# Patient Record
Sex: FEMALE | Race: WHITE | Employment: OTHER | URBAN - METROPOLITAN AREA
[De-identification: names, ages, dates, MRNs, and addresses within clinical notes are randomized per-mention and may not be internally consistent; named-entity substitution may affect disease eponyms.]

---

## 2018-03-26 ENCOUNTER — OFFICE VISIT (OUTPATIENT)
Dept: ORTHOPEDIC SURGERY | Age: 73
End: 2018-03-26
Payer: MEDICARE

## 2018-03-26 VITALS
DIASTOLIC BLOOD PRESSURE: 84 MMHG | RESPIRATION RATE: 18 BRPM | TEMPERATURE: 98.2 F | SYSTOLIC BLOOD PRESSURE: 129 MMHG | HEART RATE: 91 BPM

## 2018-03-26 DIAGNOSIS — T14.8XXA FX: Primary | ICD-10-CM

## 2018-03-26 DIAGNOSIS — S52.602A CLOSED FRACTURE OF LEFT DISTAL RADIUS AND ULNA, CLOSED, INITIAL ENCOUNTER: ICD-10-CM

## 2018-03-26 DIAGNOSIS — S52.502A CLOSED FRACTURE OF LEFT DISTAL RADIUS AND ULNA, CLOSED, INITIAL ENCOUNTER: ICD-10-CM

## 2018-03-26 PROCEDURE — 99204 OFFICE O/P NEW MOD 45 MIN: CPT | Performed by: ORTHOPAEDIC SURGERY

## 2018-03-26 PROCEDURE — 29125 APPL SHORT ARM SPLINT STATIC: CPT | Performed by: ORTHOPAEDIC SURGERY

## 2018-03-26 RX ORDER — MELATONIN 10 MG
CAPSULE ORAL
COMMUNITY
End: 2018-03-27

## 2018-03-26 RX ORDER — LEVOTHYROXINE SODIUM 0.07 MG/1
TABLET ORAL
COMMUNITY
Start: 2005-06-11 | End: 2018-03-26

## 2018-03-26 RX ORDER — HYDROCODONE BITARTRATE AND ACETAMINOPHEN 5; 325 MG/1; MG/1
TABLET ORAL
Refills: 0 | COMMUNITY
Start: 2018-03-23 | End: 2018-03-26

## 2018-03-26 RX ORDER — LEVOTHYROXINE SODIUM 0.07 MG/1
75 TABLET ORAL
Refills: 0 | COMMUNITY
Start: 2018-03-01

## 2018-03-26 RX ORDER — HYDROCODONE BITARTRATE AND ACETAMINOPHEN 10; 325 MG/1; MG/1
TABLET ORAL
Refills: 0 | COMMUNITY
Start: 2018-03-02 | End: 2020-01-01

## 2018-03-26 RX ORDER — HYDROCODONE BITARTRATE AND ACETAMINOPHEN 10; 325 MG/1; MG/1
1 TABLET ORAL EVERY 4 HOURS PRN
Qty: 42 TABLET | Refills: 0 | Status: SHIPPED | OUTPATIENT
Start: 2018-03-26 | End: 2018-04-02

## 2018-03-26 NOTE — PROGRESS NOTES
Volar splint applied to left wrist.
91   Temp 98.2 °F (36.8 °C) (Oral)   Resp 18   CONSTITUTIONAL:  awake, alert, cooperative, no apparent distress, and appears stated age  EYES: Lids and lashes normal, pupils equal, round and reactive to light, extra ocular muscles intact  HENT: Normocephalic, without obvious abnormality, atraumatic, neck supple, symmetric  LUNGS: No increased work of breathing  CARDIOVASCULAR: Brisk vascular capillary refill to all extremities  ABDOMEN: Non-tender, Non-distended  NEUROLOGIC: Awake, alert, oriented to name, place and time. Cranial nerves II-XII are grossly intact. MUSCULOSKELETAL:  Left upper Extremity:  Left wrist Skin intact circumferentially  +TTP Distal radius  Forearm -  Compartments soft and compressible  + Swelling over the Distal Radius  +AIN/PIN/Ulnar nerve function intact grossly  +Radial pulse, Brisk Cap refill, hand warm and perfused  Sensation intact to touch in radial/ulnar/median nerve distributions to hand    DATA:    CBC: No results found for: WBC, RBC, HGB, HCT, MCV, MCH, MCHC, RDW, PLT, MPV  PT/INR:  No results found for: PROTIME, INR    Radiology Review:  03/26/18 - XR Left wrist 3 - views - AP/Oliq/ Lat  Comminuted intra- articular with dorsal tilt and shortening disatl radius fracture , Ulnar styloid fracture   Impression - Left Distal radius fracture    IMPRESSION:  ·  Closed, Left Distal Radius Fx    PLAN:  Discussed findings and treatment options.   Non-weight bearing to Left Upper Extermity  Ice and elevation to  leftUE  We are recommendation is surgical ORIF for Left wrist  Patient in agreement with place  Plan for Left wrist ORIF    I explained the risks, benefits, alternatives and complications of surgery with the patient including but not limited to the risks of infection, possible damage to nerves, vessels, or tendons, stiffness, loss of range of motion, scar sensitivity, wound healing complications, worsening symptoms, possible need for therapy, as well as the possible need further

## 2018-03-27 ENCOUNTER — PREP FOR PROCEDURE (OUTPATIENT)
Dept: ORTHOPEDIC SURGERY | Age: 73
End: 2018-03-27

## 2018-03-27 RX ORDER — VITAMIN B COMPLEX
1 CAPSULE ORAL DAILY
COMMUNITY
End: 2020-01-01

## 2018-03-27 RX ORDER — SODIUM CHLORIDE 0.9 % (FLUSH) 0.9 %
10 SYRINGE (ML) INJECTION EVERY 12 HOURS SCHEDULED
Status: CANCELLED | OUTPATIENT
Start: 2018-03-27

## 2018-03-27 RX ORDER — SODIUM CHLORIDE 9 MG/ML
INJECTION, SOLUTION INTRAVENOUS CONTINUOUS
Status: CANCELLED | OUTPATIENT
Start: 2018-03-27

## 2018-03-27 RX ORDER — NAPROXEN SODIUM 220 MG
220 TABLET ORAL PRN
COMMUNITY
End: 2020-01-01

## 2018-03-27 RX ORDER — SODIUM CHLORIDE 0.9 % (FLUSH) 0.9 %
10 SYRINGE (ML) INJECTION PRN
Status: CANCELLED | OUTPATIENT
Start: 2018-03-27

## 2018-03-27 NOTE — H&P
of range of motion, scar sensitivity, wound healing complications, worsening symptoms, possible need for therapy, as well as the possible need further surgery and unanticipated complications.  The patient voiced understanding and all questions were answered. The patient elected to proceed with surgical intervention.     I have seen and evaluated the patient and agree with the above assessment and plan on today's visit. I have performed the key components of the history and physical examination with significant findings of left wrist severely comminuted intra-articular displaced fracture. Severity of injury was explained to the patient. Plan for surgical stabilization.  I concur with the findings and plan as documented.

## 2018-03-27 NOTE — PROGRESS NOTES
[facility] PRE-ADMISSION TESTING INSTRUCTIONS    The Preadmission Testing patient is instructed accordingly using the following criteria (check applicable):    ARRIVAL INSTRUCTIONS:  [x] Parking the day of Surgery is located in the Main Entrance lot. Upon entering the door, make an immediate right to the surgery reception desk    [x] Complimentary [facility] Park[employer] is available Monday through Friday 6 am to 6 pm    [x] Bring photo ID and insurance card    [x] Bring in a copy of Living will or Durable Power of  papers. [x] Please be sure to arrange transportation to and from the hospital    [x] Please arrange for someone to be with you for the 24 hour period post procedure due to having anesthesia      GENERAL INSTRUCTIONS:    [x] Nothing by mouth after midnight, including gum, candy, mints or water    [x] You may brush your teeth, but do not swallow any water    [x] Take medications as instructed with 1-2 oz of water    [x] Stop herbal supplements and vitamins 5 days prior to procedure    [x] Follow preop dosing of blood thinners per physician instructions    [] Do not take insulin or oral diabetic medications    [] If diabetic and have low blood sugar or feel symptomatic, take 1-2oz apple juice or glucose tablets    [] Bring inhalers day of surgery    [] Bring C-PAP/ Bi-Pap day of surgery    [] Bring urine specimen day of surgery    [x] Shower or bath with soap, lather and rinse well, AM of Surgery, no lotion, powders or creams to surgical site    [] Follow bowel prep as instructed per surgeon    [x] No tobacco products within 24 hours of surgery     [x] No alcohol or illegal drug use within 24 hours of surgery.     [x] Jewelry, body piercing's, eyeglasses, contact lenses and dentures are not permitted into surgery (bring cases)      [x] Please do not wear any nail polish, make up or hair products on the day of surgery    [x] If not already done, you can expect a call from

## 2018-03-28 ENCOUNTER — ANESTHESIA EVENT (OUTPATIENT)
Dept: OPERATING ROOM | Age: 73
End: 2018-03-28
Payer: MEDICARE

## 2018-03-28 ENCOUNTER — HOSPITAL ENCOUNTER (OUTPATIENT)
Dept: GENERAL RADIOLOGY | Age: 73
Discharge: HOME | End: 2018-03-30
Attending: ORTHOPAEDIC SURGERY
Payer: MEDICARE

## 2018-03-28 ENCOUNTER — ANESTHESIA (OUTPATIENT)
Dept: OPERATING ROOM | Age: 73
End: 2018-03-28
Payer: MEDICARE

## 2018-03-28 ENCOUNTER — HOSPITAL ENCOUNTER (OUTPATIENT)
Age: 73
Setting detail: OUTPATIENT SURGERY
Discharge: HOME | End: 2018-03-28
Attending: ORTHOPAEDIC SURGERY | Admitting: ORTHOPAEDIC SURGERY
Payer: MEDICARE

## 2018-03-28 VITALS
SYSTOLIC BLOOD PRESSURE: 177 MMHG | HEIGHT: 63 IN | WEIGHT: 105 LBS | BODY MASS INDEX: 18.61 KG/M2 | OXYGEN SATURATION: 90 % | DIASTOLIC BLOOD PRESSURE: 89 MMHG | HEART RATE: 88 BPM | TEMPERATURE: 97.5 F | RESPIRATION RATE: 16 BRPM

## 2018-03-28 VITALS
SYSTOLIC BLOOD PRESSURE: 103 MMHG | OXYGEN SATURATION: 99 % | RESPIRATION RATE: 11 BRPM | DIASTOLIC BLOOD PRESSURE: 50 MMHG

## 2018-03-28 DIAGNOSIS — R52 PAIN: ICD-10-CM

## 2018-03-28 PROBLEM — G89.18 POST-OP PAIN: Status: ACTIVE | Noted: 2018-03-28

## 2018-03-28 PROCEDURE — 76000 FLUOROSCOPY <1 HR PHYS/QHP: CPT

## 2018-03-28 PROCEDURE — 3600000013 HC SURGERY LEVEL 3 ADDTL 15MIN: Performed by: ORTHOPAEDIC SURGERY

## 2018-03-28 PROCEDURE — 25609 OPTX DST RD XART FX/EP SEP3+: CPT | Performed by: ORTHOPAEDIC SURGERY

## 2018-03-28 PROCEDURE — 3700000000 HC ANESTHESIA ATTENDED CARE: Performed by: ORTHOPAEDIC SURGERY

## 2018-03-28 PROCEDURE — C1713 ANCHOR/SCREW BN/BN,TIS/BN: HCPCS | Performed by: ORTHOPAEDIC SURGERY

## 2018-03-28 PROCEDURE — 3700000001 HC ADD 15 MINUTES (ANESTHESIA): Performed by: ORTHOPAEDIC SURGERY

## 2018-03-28 PROCEDURE — 93005 ELECTROCARDIOGRAM TRACING: CPT

## 2018-03-28 PROCEDURE — C1769 GUIDE WIRE: HCPCS | Performed by: ORTHOPAEDIC SURGERY

## 2018-03-28 PROCEDURE — 2500000003 HC RX 250 WO HCPCS: Performed by: ORTHOPAEDIC SURGERY

## 2018-03-28 PROCEDURE — 6370000000 HC RX 637 (ALT 250 FOR IP): Performed by: ANESTHESIOLOGY

## 2018-03-28 PROCEDURE — 25650 CLTX ULNAR STYLOID FRACTURE: CPT | Performed by: ORTHOPAEDIC SURGERY

## 2018-03-28 PROCEDURE — 3600000003 HC SURGERY LEVEL 3 BASE: Performed by: ORTHOPAEDIC SURGERY

## 2018-03-28 PROCEDURE — 7100000010 HC PHASE II RECOVERY - FIRST 15 MIN: Performed by: ORTHOPAEDIC SURGERY

## 2018-03-28 PROCEDURE — 7100000001 HC PACU RECOVERY - ADDTL 15 MIN: Performed by: ORTHOPAEDIC SURGERY

## 2018-03-28 PROCEDURE — 2500000003 HC RX 250 WO HCPCS: Performed by: NURSE ANESTHETIST, CERTIFIED REGISTERED

## 2018-03-28 PROCEDURE — 7100000000 HC PACU RECOVERY - FIRST 15 MIN: Performed by: ORTHOPAEDIC SURGERY

## 2018-03-28 PROCEDURE — 2580000003 HC RX 258: Performed by: NURSE ANESTHETIST, CERTIFIED REGISTERED

## 2018-03-28 PROCEDURE — 6360000002 HC RX W HCPCS: Performed by: PHYSICIAN ASSISTANT

## 2018-03-28 PROCEDURE — 6360000002 HC RX W HCPCS: Performed by: NURSE ANESTHETIST, CERTIFIED REGISTERED

## 2018-03-28 PROCEDURE — 6360000002 HC RX W HCPCS: Performed by: ANESTHESIOLOGY

## 2018-03-28 PROCEDURE — 7100000011 HC PHASE II RECOVERY - ADDTL 15 MIN: Performed by: ORTHOPAEDIC SURGERY

## 2018-03-28 DEVICE — 2.3MM X 18MM LOCKING CORTICAL SCREW
Type: IMPLANTABLE DEVICE | Site: WRIST | Status: FUNCTIONAL
Brand: ACUMED

## 2018-03-28 DEVICE — 3.5MM X 12MM LOCKING HEXALOBE SCREW
Type: IMPLANTABLE DEVICE | Site: WRIST | Status: FUNCTIONAL
Brand: ACUMED

## 2018-03-28 DEVICE — 3.5MM X 12MM NON-LOCKING HEXALOBE SCREW
Type: IMPLANTABLE DEVICE | Site: WRIST | Status: FUNCTIONAL
Brand: ACUMED

## 2018-03-28 DEVICE — 3.5MM X 10MM LOCKING HEXALOBE SCREW
Type: IMPLANTABLE DEVICE | Site: WRIST | Status: FUNCTIONAL
Brand: ACUMED

## 2018-03-28 DEVICE — IMPLANTABLE DEVICE: Type: IMPLANTABLE DEVICE | Site: WRIST | Status: FUNCTIONAL

## 2018-03-28 DEVICE — 2.3MM X 20MM LOCKING CORTICAL SCREW
Type: IMPLANTABLE DEVICE | Site: WRIST | Status: FUNCTIONAL
Brand: ACUMED

## 2018-03-28 RX ORDER — SODIUM CHLORIDE 9 MG/ML
INJECTION, SOLUTION INTRAVENOUS CONTINUOUS PRN
Status: DISCONTINUED | OUTPATIENT
Start: 2018-03-28 | End: 2018-03-28 | Stop reason: SDUPTHER

## 2018-03-28 RX ORDER — PROPOFOL 10 MG/ML
INJECTION, EMULSION INTRAVENOUS PRN
Status: DISCONTINUED | OUTPATIENT
Start: 2018-03-28 | End: 2018-03-28 | Stop reason: SDUPTHER

## 2018-03-28 RX ORDER — ONDANSETRON 2 MG/ML
INJECTION INTRAMUSCULAR; INTRAVENOUS PRN
Status: DISCONTINUED | OUTPATIENT
Start: 2018-03-28 | End: 2018-03-28 | Stop reason: SDUPTHER

## 2018-03-28 RX ORDER — DIPHENHYDRAMINE HYDROCHLORIDE 50 MG/ML
12.5 INJECTION INTRAMUSCULAR; INTRAVENOUS
Status: DISCONTINUED | OUTPATIENT
Start: 2018-03-28 | End: 2018-03-28 | Stop reason: HOSPADM

## 2018-03-28 RX ORDER — HYDROCODONE BITARTRATE AND ACETAMINOPHEN 5; 325 MG/1; MG/1
2 TABLET ORAL PRN
Status: DISCONTINUED | OUTPATIENT
Start: 2018-03-28 | End: 2018-03-28 | Stop reason: HOSPADM

## 2018-03-28 RX ORDER — SODIUM CHLORIDE 9 MG/ML
INJECTION, SOLUTION INTRAVENOUS CONTINUOUS
Status: DISCONTINUED | OUTPATIENT
Start: 2018-03-28 | End: 2018-03-28 | Stop reason: HOSPADM

## 2018-03-28 RX ORDER — BUPIVACAINE HYDROCHLORIDE AND EPINEPHRINE 5; 5 MG/ML; UG/ML
INJECTION, SOLUTION EPIDURAL; INTRACAUDAL; PERINEURAL PRN
Status: DISCONTINUED | OUTPATIENT
Start: 2018-03-28 | End: 2018-03-28 | Stop reason: HOSPADM

## 2018-03-28 RX ORDER — FENTANYL CITRATE 50 UG/ML
INJECTION, SOLUTION INTRAMUSCULAR; INTRAVENOUS PRN
Status: DISCONTINUED | OUTPATIENT
Start: 2018-03-28 | End: 2018-03-28 | Stop reason: SDUPTHER

## 2018-03-28 RX ORDER — HYDROCODONE BITARTRATE AND ACETAMINOPHEN 5; 325 MG/1; MG/1
1 TABLET ORAL PRN
Status: DISCONTINUED | OUTPATIENT
Start: 2018-03-28 | End: 2018-03-28 | Stop reason: HOSPADM

## 2018-03-28 RX ORDER — SODIUM CHLORIDE 0.9 % (FLUSH) 0.9 %
10 SYRINGE (ML) INJECTION PRN
Status: DISCONTINUED | OUTPATIENT
Start: 2018-03-28 | End: 2018-03-28 | Stop reason: HOSPADM

## 2018-03-28 RX ORDER — MEPERIDINE HYDROCHLORIDE 25 MG/ML
12.5 INJECTION INTRAMUSCULAR; INTRAVENOUS; SUBCUTANEOUS EVERY 5 MIN PRN
Status: DISCONTINUED | OUTPATIENT
Start: 2018-03-28 | End: 2018-03-28 | Stop reason: HOSPADM

## 2018-03-28 RX ORDER — MIDAZOLAM HYDROCHLORIDE 1 MG/ML
INJECTION INTRAMUSCULAR; INTRAVENOUS PRN
Status: DISCONTINUED | OUTPATIENT
Start: 2018-03-28 | End: 2018-03-28 | Stop reason: SDUPTHER

## 2018-03-28 RX ORDER — DEXAMETHASONE SODIUM PHOSPHATE 4 MG/ML
INJECTION, SOLUTION INTRA-ARTICULAR; INTRALESIONAL; INTRAMUSCULAR; INTRAVENOUS; SOFT TISSUE PRN
Status: DISCONTINUED | OUTPATIENT
Start: 2018-03-28 | End: 2018-03-28 | Stop reason: SDUPTHER

## 2018-03-28 RX ORDER — SODIUM CHLORIDE 0.9 % (FLUSH) 0.9 %
10 SYRINGE (ML) INJECTION EVERY 12 HOURS SCHEDULED
Status: DISCONTINUED | OUTPATIENT
Start: 2018-03-28 | End: 2018-03-28 | Stop reason: HOSPADM

## 2018-03-28 RX ORDER — LIDOCAINE HYDROCHLORIDE 20 MG/ML
INJECTION, SOLUTION EPIDURAL; INFILTRATION; INTRACAUDAL; PERINEURAL PRN
Status: DISCONTINUED | OUTPATIENT
Start: 2018-03-28 | End: 2018-03-28 | Stop reason: SDUPTHER

## 2018-03-28 RX ADMIN — FENTANYL CITRATE 50 MCG: 50 INJECTION, SOLUTION INTRAMUSCULAR; INTRAVENOUS at 14:20

## 2018-03-28 RX ADMIN — ONDANSETRON 4 MG: 2 INJECTION, SOLUTION INTRAMUSCULAR; INTRAVENOUS at 14:45

## 2018-03-28 RX ADMIN — PROPOFOL 100 MG: 10 INJECTION, EMULSION INTRAVENOUS at 14:15

## 2018-03-28 RX ADMIN — HYDROMORPHONE HYDROCHLORIDE 0.5 MG: 1 INJECTION, SOLUTION INTRAMUSCULAR; INTRAVENOUS; SUBCUTANEOUS at 16:09

## 2018-03-28 RX ADMIN — HYDROMORPHONE HYDROCHLORIDE 0.5 MG: 1 INJECTION, SOLUTION INTRAMUSCULAR; INTRAVENOUS; SUBCUTANEOUS at 16:20

## 2018-03-28 RX ADMIN — SODIUM CHLORIDE: 9 INJECTION, SOLUTION INTRAVENOUS at 14:10

## 2018-03-28 RX ADMIN — PROPOFOL 50 MG: 10 INJECTION, EMULSION INTRAVENOUS at 14:20

## 2018-03-28 RX ADMIN — DEXAMETHASONE SODIUM PHOSPHATE 8 MG: 4 INJECTION, SOLUTION INTRA-ARTICULAR; INTRALESIONAL; INTRAMUSCULAR; INTRAVENOUS; SOFT TISSUE at 14:15

## 2018-03-28 RX ADMIN — HYDROMORPHONE HYDROCHLORIDE 0.5 MG: 1 INJECTION, SOLUTION INTRAMUSCULAR; INTRAVENOUS; SUBCUTANEOUS at 16:32

## 2018-03-28 RX ADMIN — MIDAZOLAM HYDROCHLORIDE 2 MG: 1 INJECTION, SOLUTION INTRAMUSCULAR; INTRAVENOUS at 14:10

## 2018-03-28 RX ADMIN — CEFAZOLIN SODIUM 2 G: 2 SOLUTION INTRAVENOUS at 14:10

## 2018-03-28 RX ADMIN — LIDOCAINE HYDROCHLORIDE 100 MG: 20 INJECTION, SOLUTION EPIDURAL; INFILTRATION; INTRACAUDAL; PERINEURAL at 14:15

## 2018-03-28 RX ADMIN — HYDROMORPHONE HYDROCHLORIDE 0.5 MG: 1 INJECTION, SOLUTION INTRAMUSCULAR; INTRAVENOUS; SUBCUTANEOUS at 16:15

## 2018-03-28 RX ADMIN — FENTANYL CITRATE 50 MCG: 50 INJECTION, SOLUTION INTRAMUSCULAR; INTRAVENOUS at 14:15

## 2018-03-28 ASSESSMENT — PULMONARY FUNCTION TESTS
PIF_VALUE: 10
PIF_VALUE: 5
PIF_VALUE: 8
PIF_VALUE: 10
PIF_VALUE: 8
PIF_VALUE: 1
PIF_VALUE: 10
PIF_VALUE: 8
PIF_VALUE: 13
PIF_VALUE: 10
PIF_VALUE: 17
PIF_VALUE: 5
PIF_VALUE: 10
PIF_VALUE: 5
PIF_VALUE: 10
PIF_VALUE: 4
PIF_VALUE: 10
PIF_VALUE: 13
PIF_VALUE: 17
PIF_VALUE: 10
PIF_VALUE: 10
PIF_VALUE: 0
PIF_VALUE: 10
PIF_VALUE: 0
PIF_VALUE: 10
PIF_VALUE: 10
PIF_VALUE: 1
PIF_VALUE: 8
PIF_VALUE: 10
PIF_VALUE: 17
PIF_VALUE: 1
PIF_VALUE: 10
PIF_VALUE: 13
PIF_VALUE: 10
PIF_VALUE: 17
PIF_VALUE: 10
PIF_VALUE: 17
PIF_VALUE: 13
PIF_VALUE: 10
PIF_VALUE: 10
PIF_VALUE: 17
PIF_VALUE: 14

## 2018-03-28 ASSESSMENT — PAIN - FUNCTIONAL ASSESSMENT: PAIN_FUNCTIONAL_ASSESSMENT: 0-10

## 2018-03-28 ASSESSMENT — PAIN SCALES - GENERAL
PAINLEVEL_OUTOF10: 7
PAINLEVEL_OUTOF10: 9
PAINLEVEL_OUTOF10: 8
PAINLEVEL_OUTOF10: 8
PAINLEVEL_OUTOF10: 0
PAINLEVEL_OUTOF10: 6
PAINLEVEL_OUTOF10: 6

## 2018-03-28 ASSESSMENT — PAIN DESCRIPTION - ORIENTATION
ORIENTATION: LEFT
ORIENTATION: LEFT

## 2018-03-28 ASSESSMENT — PAIN DESCRIPTION - LOCATION
LOCATION: ARM
LOCATION: ARM

## 2018-03-28 ASSESSMENT — PAIN DESCRIPTION - DESCRIPTORS: DESCRIPTORS: DISCOMFORT

## 2018-03-28 NOTE — ANESTHESIA PRE PROCEDURE
Department of Anesthesiology  Preprocedure Note       Name:  Adriana Zarate   Age:  71 y.o.  :  1945                                          MRN:  [id number]         Date:  3/28/2018      Surgeon: Corey Vinson):  Becky Esposito MD    Procedure: Procedure(s):  LEFT  DISTAL RADIUS  OPEN REDUCTION INTERNAL FIXATION  +++ACUMED++    Medications prior to admission:   Prior to Admission medications    Medication Sig Start Date End Date Taking? Authorizing Provider   naproxen sodium (ANAPROX) 220 MG tablet Take 220 mg by mouth as needed for Pain   Yes Historical Provider, MD   b complex vitamins capsule Take 1 capsule by mouth daily   Yes Historical Provider, MD   levothyroxine (SYNTHROID) 75 MCG tablet take 1 tablet by mouth once daily 3/1/18  Yes Historical Provider, MD   HYDROcodone-acetaminophen ([facility])  MG per tablet take 1 tablet by mouth three times a day if needed 3/2/18  Yes Historical Provider, MD   Adalimumab (HUMIRA P[employer]-CROH[employer]ARTER SC) Inject into the skin every 14 days Last dose 2018; pt states held 2018 per instructions per Dr Jerald Jansen   Yes Historical Provider, MD   ibuprofen (IBU) 800 MG tablet Take 1 tablet by mouth every 8 hours as needed for Pain for 7 days. 7/25/13 3/27/18 Yes Peter Mcgee CNP   HYDROcodone-acetaminophen (NORCO)  MG per tablet Take 1 tablet by mouth every 4 hours as needed for Pain for up to 7 days.  3/26/18 4/2/18  Becky Esposito MD       Current medications:    Current Facility-Administered Medications   Medication Dose Route Frequency Provider Last Rate Last Dose    meperidine (DEMEROL) injection 12.5 mg  12.5 mg Intravenous Q5 Min PRN Adrianna Rincon MD        diphenhydrAMINE (BENADRYL) injection 12.5 mg  12.5 mg Intravenous Once PRN Adrianna Rincon MD        HYDROcodone-acetaminophen [facility]) 5-325 MG per tablet 1 tablet  1 tablet Oral PRN Adrianna Rincon MD        Or   Elis Laughlin HYDROcodone-acetaminophen (NORCO) 5-325 MG per tablet 2 tablet  2 POCHCT in the last 72 hours. Coags: No results found for: PROTIME, INR, APTT    HCG (If Applicable): No results found for: PREGTESTUR, PREGSERUM, HCG, HCGQUANT     ABGs: No results found for: PHART, PO2ART, IUP3MFL, QSX3PHF, BEART, S6YMKWPC     Type & Screen (If Applicable):  No results found for: LABABO, [address]    Anesthesia Evaluation  Patient summary reviewed no history of anesthetic complications:   Airway: Mallampati: II  TM distance: >3 FB   Neck ROM: full   Dental: normal exam         Pulmonary:Negative Pulmonary ROS breath sounds clear to auscultation                            ROS comment: Former Smoker   Cardiovascular:Negative CV ROS            Rhythm: regular  Rate: normal           Beta Blocker:  Not on Beta Blocker         Neuro/Psych:   Negative Neuro/Psych ROS              GI/Hepatic/Renal: Neg GI/Hepatic/Renal ROS            Endo/Other:    (+) hypothyroidism::., malignancy/cancer (breast, vulva, skin). ROS comment: Psoriasis Abdominal:           Vascular: negative vascular ROS. Anesthesia Plan      general     ASA 2     (#4 LMA)  Induction: intravenous. Anesthetic plan and risks discussed with patient. Plan discussed with CRNA.                   Christine Rubio MD   3/28/2018

## 2018-03-29 LAB
EKG ATRIAL RATE: 72 BPM
EKG P AXIS: 57 DEGREES
EKG P-R INTERVAL: 178 MS
EKG Q-T INTERVAL: 386 MS
EKG QRS DURATION: 78 MS
EKG QTC CALCULATION (BAZETT): 422 MS
EKG R AXIS: 44 DEGREES
EKG T AXIS: 57 DEGREES
EKG VENTRICULAR RATE: 72 BPM

## 2018-04-04 ENCOUNTER — TELEPHONE (OUTPATIENT)
Dept: ORTHOPEDIC SURGERY | Age: 73
End: 2018-04-04

## 2018-04-04 DIAGNOSIS — S52.602A CLOSED FRACTURE OF LEFT DISTAL RADIUS AND ULNA, CLOSED, INITIAL ENCOUNTER: Primary | ICD-10-CM

## 2018-04-04 DIAGNOSIS — S52.502A CLOSED FRACTURE OF LEFT DISTAL RADIUS AND ULNA, CLOSED, INITIAL ENCOUNTER: Primary | ICD-10-CM

## 2018-04-05 ENCOUNTER — OFFICE VISIT (OUTPATIENT)
Dept: ORTHOPEDIC SURGERY | Age: 73
End: 2018-04-05
Payer: MEDICARE

## 2018-04-05 VITALS
HEIGHT: 63 IN | SYSTOLIC BLOOD PRESSURE: 139 MMHG | RESPIRATION RATE: 18 BRPM | TEMPERATURE: 98 F | HEART RATE: 85 BPM | DIASTOLIC BLOOD PRESSURE: 86 MMHG

## 2018-04-05 DIAGNOSIS — S52.502A CLOSED FRACTURE OF LEFT DISTAL RADIUS AND ULNA, CLOSED, INITIAL ENCOUNTER: Primary | ICD-10-CM

## 2018-04-05 DIAGNOSIS — S52.602A CLOSED FRACTURE OF LEFT DISTAL RADIUS AND ULNA, CLOSED, INITIAL ENCOUNTER: Primary | ICD-10-CM

## 2018-04-05 PROCEDURE — 99024 POSTOP FOLLOW-UP VISIT: CPT | Performed by: ORTHOPAEDIC SURGERY

## 2018-04-05 PROCEDURE — L3908 WHO COCK-UP NONMOLDE PRE OTS: HCPCS | Performed by: ORTHOPAEDIC SURGERY

## 2018-05-03 ENCOUNTER — OFFICE VISIT (OUTPATIENT)
Dept: ORTHOPEDIC SURGERY | Age: 73
End: 2018-05-03

## 2018-05-03 VITALS
DIASTOLIC BLOOD PRESSURE: 94 MMHG | HEART RATE: 86 BPM | WEIGHT: 105 LBS | SYSTOLIC BLOOD PRESSURE: 144 MMHG | HEIGHT: 63 IN | BODY MASS INDEX: 18.61 KG/M2 | TEMPERATURE: 97.8 F | RESPIRATION RATE: 18 BRPM

## 2018-05-03 DIAGNOSIS — S52.602A CLOSED FRACTURE OF LEFT DISTAL RADIUS AND ULNA, CLOSED, INITIAL ENCOUNTER: Primary | ICD-10-CM

## 2018-05-03 DIAGNOSIS — S52.502A CLOSED FRACTURE OF LEFT DISTAL RADIUS AND ULNA, CLOSED, INITIAL ENCOUNTER: Primary | ICD-10-CM

## 2018-05-03 PROCEDURE — 99024 POSTOP FOLLOW-UP VISIT: CPT | Performed by: ORTHOPAEDIC SURGERY

## 2018-06-07 ENCOUNTER — OFFICE VISIT (OUTPATIENT)
Dept: ORTHOPEDIC SURGERY | Age: 73
End: 2018-06-07

## 2018-06-07 VITALS
TEMPERATURE: 98.1 F | RESPIRATION RATE: 16 BRPM | DIASTOLIC BLOOD PRESSURE: 86 MMHG | SYSTOLIC BLOOD PRESSURE: 119 MMHG | HEART RATE: 85 BPM

## 2018-06-07 DIAGNOSIS — S52.602A CLOSED FRACTURE OF LEFT DISTAL RADIUS AND ULNA, CLOSED, INITIAL ENCOUNTER: Primary | ICD-10-CM

## 2018-06-07 DIAGNOSIS — S52.502A CLOSED FRACTURE OF LEFT DISTAL RADIUS AND ULNA, CLOSED, INITIAL ENCOUNTER: Primary | ICD-10-CM

## 2018-06-07 PROCEDURE — 99024 POSTOP FOLLOW-UP VISIT: CPT | Performed by: ORTHOPAEDIC SURGERY

## 2018-11-12 ENCOUNTER — OFFICE VISIT (OUTPATIENT)
Dept: ORTHOPEDIC SURGERY | Age: 73
End: 2018-11-12
Payer: MEDICARE

## 2018-11-12 VITALS
RESPIRATION RATE: 16 BRPM | DIASTOLIC BLOOD PRESSURE: 83 MMHG | TEMPERATURE: 98.7 F | SYSTOLIC BLOOD PRESSURE: 137 MMHG | HEART RATE: 78 BPM

## 2018-11-12 DIAGNOSIS — G89.29 CHRONIC LEFT SHOULDER PAIN: Primary | ICD-10-CM

## 2018-11-12 DIAGNOSIS — M25.512 CHRONIC LEFT SHOULDER PAIN: Primary | ICD-10-CM

## 2018-11-12 PROCEDURE — 20610 DRAIN/INJ JOINT/BURSA W/O US: CPT | Performed by: ORTHOPAEDIC SURGERY

## 2018-11-12 PROCEDURE — 99213 OFFICE O/P EST LOW 20 MIN: CPT | Performed by: ORTHOPAEDIC SURGERY

## 2018-11-12 RX ORDER — TRIAMCINOLONE ACETONIDE 40 MG/ML
80 INJECTION, SUSPENSION INTRA-ARTICULAR; INTRAMUSCULAR ONCE
Status: COMPLETED | OUTPATIENT
Start: 2018-11-12 | End: 2018-11-12

## 2018-11-12 RX ADMIN — TRIAMCINOLONE ACETONIDE 80 MG: 40 INJECTION, SUSPENSION INTRA-ARTICULAR; INTRAMUSCULAR at 10:48

## 2018-11-12 NOTE — PATIENT INSTRUCTIONS
Patient Education        Shoulder Stretches: Exercises  Your Care Instructions  Here are some examples of exercises for your shoulder. Start each exercise slowly. Ease off the exercise if you start to have pain. Your doctor or physical therapist will tell you when you can start these exercises and which ones will work best for you. How to do the exercises  Shoulder stretch    1.  a doorway and place one arm against the door frame. Your elbow should be a little higher than your shoulder. 2. Relax your shoulders as you lean forward, allowing your chest and shoulder muscles to stretch. You can also turn your body slightly away from your arm to stretch the muscles even more. 3. Hold for 15 to 30 seconds. 4. Repeat 2 to 4 times with each arm. Shoulder and chest stretch    1. Shoulder and chest stretch  2. While sitting, relax your upper body so you slump slightly in your chair. 3. As you breathe in, straighten your back and open your arms out to the sides. 4. Gently pull your shoulder blades back and downward. 5. Hold for 15 to 30 seconds as your breathe normally. 6. Repeat 2 to 4 times. Overhead stretch    1. Reach up over your head with both arms. 2. Hold for 15 to 30 seconds. 3. Repeat 2 to 4 times. Follow-up care is a key part of your treatment and safety. Be sure to make and go to all appointments, and call your doctor if you are having problems. It's also a good idea to know your test results and keep a list of the medicines you take. Where can you learn more? Go to [email or web address][email or web address]. org and sign in to your [employer] account. Enter S254 in the [facility] box to learn more about \"Shoulder Stretches: Exercises. \"     If you do not have an account, please click on the \"Sign Up Now\" link. Current as of: November 29, 2017  Content Version: 11.8  © 9576-5183 Healthwise, Incorporated. Care instructions adapted under license by [facility] ([facility]).  If you have questions about a medical condition or this instruction, always ask your healthcare professional. [address] any warranty or liability for your use of this information. Patient Education        Frozen Shoulder: Exercises  Your Care Instructions  Here are some examples of typical rehabilitation exercises for your condition. Start each exercise slowly. Ease off the exercise if you start to have pain. Your doctor or physical therapist will tell you when you can start these exercises and which ones will work best for you. How to do the exercises  Neck stretches    1. Look straight ahead, and tip your right ear to your right shoulder. Do not let your left shoulder rise up as you tip your head to the right. 2. Hold 15 to 30 seconds. 3. Tilt your head to the left. Do not let your right shoulder rise up as you tip your head to the left. 4. Hold for 15 to 30 seconds. 5. Repeat 2 to 4 times to each side. Shoulder rolls    1. Sit comfortably with your feet shoulder-width apart. You can also do this exercise while standing. 2. Roll your shoulders up, then back, and then down in a smooth, circular motion. 3. Repeat 2 to 4 times. Shoulder flexion (lying down)    1. Lie on your back, holding a wand with your hands. Your palms should face down as you hold the wand. Place your hands slightly wider than your shoulders. 2. Keeping your elbows straight, slowly raise your arms over your head until you feel a stretch in your shoulders, upper back, and chest.  3. Hold 15 to 30 seconds. 4. Repeat 2 to 4 times. Shoulder rotation (lying down)    1. Lie on your back and hold a wand in both hands with your elbows bent and your palms up. 2. Keeping your elbows close to your body, move the wand across your body toward the arm that has pain. 3. Hold for 15 to 30 seconds. 4. Repeat 2 to 4 times. Shoulder internal rotation with towel    1. Roll up a towel lengthwise.  Hold the towel above and behind your

## 2018-11-12 NOTE — PROGRESS NOTES
negative  GASTROINTESTINAL:  negative  GENITOURINARY:  negative  INTEGUMENT/BREAST:  negative  HEMATOLOGIC/LYMPHATIC:  negative  ALLERGIC/IMMUNOLOGIC:  negative  ENDOCRINE:  Thyroid disease  MUSCULOSKELETAL:  negative  NEUROLOGICAL:  negative  BEHAVIOR/PSYCH:  negative    PHYSICAL EXAM:    VITALS:  /83 (Site: Left Upper Arm, Position: Sitting)   Pulse 78   Temp 98.7 °F (37.1 °C) (Oral)   Resp 16   CONSTITUTIONAL:  awake, alert, cooperative, no apparent distress, and appears stated age  EYES:  Lids and lashes normal, pupils equal, round and reactive to light, extra ocular muscles intact, sclera clear, conjunctiva normal  ENT:  Normocephalic, without obvious abnormality, atraumatic, sinuses nontender on palpation, external ears without lesions, oral pharynx with moist mucus membranes, tonsils without erythema or exudates, gums normal and good dentition. NECK:  Supple, symmetrical, trachea midline, no adenopathy, thyroid symmetric, not enlarged and no tenderness, skin normal  NEUROLOGIC:  Awake, alert, oriented to name, place and time. Cranial nerves II-XII are grossly intact. Motor is 5 out of 5 bilaterally. Sensory is intact. gait is normal.  MUSCULOSKELETAL:    Left upper extremity: Shoulder tender to palpation anterior, lateral, and posteriorly. No warmth or erythema. Negative effusion. Forward elevation actively 60°. Abduction 60°. External rotation 20°. Internal rotation to the buttock. Positive pain at the end range of motion. Passive equal to active range of motion. Radial, ulnar, median and axillary nerve sensation intact light touch. Motor testing 5/5 grossly. 2+ radial pulse. Otherwise neurovascular intact. DATA:    CBC: No results found for: WBC, RBC, HGB, HCT, MCV, MCH, MCHC, RDW, PLT, MPV  PT/INR:  No results found for: PROTIME, INR    Radiology Review:  X-rays of the left shoulder AP, scapular Y, and axillary views were obtained today in the office.  Negative for soft tissue

## 2019-01-01 ENCOUNTER — HOSPITAL ENCOUNTER (EMERGENCY)
Age: 74
Discharge: HOME | End: 2019-12-04
Attending: EMERGENCY MEDICINE
Payer: MEDICARE

## 2019-01-01 ENCOUNTER — APPOINTMENT (OUTPATIENT)
Dept: CT IMAGING | Age: 74
End: 2019-01-01
Payer: MEDICARE

## 2019-01-01 VITALS
HEIGHT: 63 IN | HEART RATE: 82 BPM | DIASTOLIC BLOOD PRESSURE: 93 MMHG | TEMPERATURE: 97.8 F | BODY MASS INDEX: 19.49 KG/M2 | WEIGHT: 110 LBS | SYSTOLIC BLOOD PRESSURE: 148 MMHG | OXYGEN SATURATION: 96 % | RESPIRATION RATE: 17 BRPM

## 2019-01-01 DIAGNOSIS — G51.0 BELL'S PALSY: Primary | ICD-10-CM

## 2019-01-01 LAB
ALBUMIN SERPL-MCNC: 3.8 G/DL (ref 3.5–5.2)
ALP BLD-CCNC: 206 U/L (ref 35–104)
ALT SERPL-CCNC: 23 U/L (ref 0–32)
ANION GAP SERPL CALCULATED.3IONS-SCNC: 6 MMOL/L (ref 7–16)
APTT: 28.4 SEC (ref 24.5–35.1)
AST SERPL-CCNC: 39 U/L (ref 0–31)
BILIRUB SERPL-MCNC: 0.2 MG/DL (ref 0–1.2)
BUN BLDV-MCNC: 13 MG/DL (ref 8–23)
CALCIUM SERPL-MCNC: 9 MG/DL (ref 8.6–10.2)
CHLORIDE BLD-SCNC: 105 MMOL/L (ref 98–107)
CO2: 25 MMOL/L (ref 22–29)
CREAT SERPL-MCNC: 0.7 MG/DL (ref 0.5–1)
EKG ATRIAL RATE: 71 BPM
EKG P AXIS: 64 DEGREES
EKG P-R INTERVAL: 160 MS
EKG Q-T INTERVAL: 392 MS
EKG QRS DURATION: 74 MS
EKG QTC CALCULATION (BAZETT): 425 MS
EKG R AXIS: 69 DEGREES
EKG T AXIS: 67 DEGREES
EKG VENTRICULAR RATE: 71 BPM
GFR AFRICAN AMERICAN: >60
GFR NON-AFRICAN AMERICAN: >60 ML/MIN/1.73
GLUCOSE BLD-MCNC: 105 MG/DL (ref 74–99)
HCT VFR BLD CALC: 39.3 % (ref 34–48)
HEMOGLOBIN: 12.7 G/DL (ref 11.5–15.5)
INR BLD: 0.9
MCH RBC QN AUTO: 30.5 PG (ref 26–35)
MCHC RBC AUTO-ENTMCNC: 32.3 % (ref 32–34.5)
MCV RBC AUTO: 94.2 FL (ref 80–99.9)
PDW BLD-RTO: 16.8 FL (ref 11.5–15)
PLATELET # BLD: 231 E9/L (ref 130–450)
PMV BLD AUTO: 12.1 FL (ref 7–12)
POTASSIUM SERPL-SCNC: 4.5 MMOL/L (ref 3.5–5)
PROTHROMBIN TIME: 10.5 SEC (ref 9.3–12.4)
RBC # BLD: 4.17 E12/L (ref 3.5–5.5)
SODIUM BLD-SCNC: 136 MMOL/L (ref 132–146)
TOTAL PROTEIN: 6.7 G/DL (ref 6.4–8.3)
TROPONIN: <0.01 NG/ML (ref 0–0.03)
WBC # BLD: 6.8 E9/L (ref 4.5–11.5)

## 2019-01-01 PROCEDURE — 6370000000 HC RX 637 (ALT 250 FOR IP): Performed by: NURSE PRACTITIONER

## 2019-01-01 PROCEDURE — 85730 THROMBOPLASTIN TIME PARTIAL: CPT

## 2019-01-01 PROCEDURE — 80053 COMPREHEN METABOLIC PANEL: CPT

## 2019-01-01 PROCEDURE — 84484 ASSAY OF TROPONIN QUANT: CPT

## 2019-01-01 PROCEDURE — 36415 COLL VENOUS BLD VENIPUNCTURE: CPT

## 2019-01-01 PROCEDURE — 85027 COMPLETE CBC AUTOMATED: CPT

## 2019-01-01 PROCEDURE — 70450 CT HEAD/BRAIN W/O DYE: CPT

## 2019-01-01 PROCEDURE — 93010 ELECTROCARDIOGRAM REPORT: CPT | Performed by: INTERNAL MEDICINE

## 2019-01-01 PROCEDURE — 85610 PROTHROMBIN TIME: CPT

## 2019-01-01 PROCEDURE — 99284 EMERGENCY DEPT VISIT MOD MDM: CPT

## 2019-01-01 PROCEDURE — 93005 ELECTROCARDIOGRAM TRACING: CPT | Performed by: NURSE PRACTITIONER

## 2019-01-01 RX ORDER — HYDROCODONE BITARTRATE AND ACETAMINOPHEN 10; 325 MG/1; MG/1
1 TABLET ORAL ONCE
Status: COMPLETED | OUTPATIENT
Start: 2019-01-01 | End: 2019-01-01

## 2019-01-01 RX ORDER — PREDNISONE 20 MG/1
TABLET ORAL
Qty: 18 TABLET | Refills: 0 | Status: SHIPPED | OUTPATIENT
Start: 2019-01-01 | End: 2020-01-01

## 2019-01-01 RX ORDER — PREDNISONE 20 MG/1
60 TABLET ORAL ONCE
Status: COMPLETED | OUTPATIENT
Start: 2019-01-01 | End: 2019-01-01

## 2019-01-01 RX ADMIN — PREDNISONE 60 MG: 20 TABLET ORAL at 12:48

## 2019-01-01 RX ADMIN — HYDROCODONE BITARTRATE AND ACETAMINOPHEN 1 TABLET: 10; 325 TABLET ORAL at 12:48

## 2019-01-01 ASSESSMENT — PAIN SCALES - GENERAL: PAINLEVEL_OUTOF10: 4

## 2020-01-01 ENCOUNTER — APPOINTMENT (OUTPATIENT)
Dept: RADIATION ONCOLOGY | Age: 75
End: 2020-01-01
Attending: RADIOLOGY
Payer: MEDICARE

## 2020-01-01 ENCOUNTER — HOSPITAL ENCOUNTER (OUTPATIENT)
Dept: RADIATION ONCOLOGY | Age: 75
Discharge: HOME | End: 2020-03-24
Attending: RADIOLOGY
Payer: MEDICARE

## 2020-01-01 ENCOUNTER — APPOINTMENT (OUTPATIENT)
Dept: CT IMAGING | Age: 75
End: 2020-01-01
Payer: MEDICARE

## 2020-01-01 ENCOUNTER — HOSPITAL ENCOUNTER (OUTPATIENT)
Dept: RADIATION ONCOLOGY | Age: 75
Discharge: HOME | End: 2020-03-27
Attending: RADIOLOGY
Payer: MEDICARE

## 2020-01-01 ENCOUNTER — OFFICE VISIT (OUTPATIENT)
Dept: PALLATIVE CARE | Age: 75
End: 2020-01-01
Payer: MEDICARE

## 2020-01-01 ENCOUNTER — APPOINTMENT (OUTPATIENT)
Dept: GENERAL RADIOLOGY | Age: 75
End: 2020-01-01
Payer: MEDICARE

## 2020-01-01 ENCOUNTER — HOSPITAL ENCOUNTER (OUTPATIENT)
Dept: RADIATION ONCOLOGY | Age: 75
Discharge: HOME | End: 2020-07-02
Attending: RADIOLOGY
Payer: MEDICARE

## 2020-01-01 ENCOUNTER — HOSPITAL ENCOUNTER (OUTPATIENT)
Age: 75
Discharge: HOME | End: 2020-03-25
Payer: MEDICARE

## 2020-01-01 ENCOUNTER — TELEPHONE (OUTPATIENT)
Dept: PALLATIVE CARE | Age: 75
End: 2020-01-01

## 2020-01-01 ENCOUNTER — TELEPHONE (OUTPATIENT)
Dept: RADIATION ONCOLOGY | Age: 75
End: 2020-01-01

## 2020-01-01 ENCOUNTER — HOSPITAL ENCOUNTER (OUTPATIENT)
Dept: RADIATION ONCOLOGY | Age: 75
Discharge: HOME | End: 2020-03-19
Payer: MEDICARE

## 2020-01-01 ENCOUNTER — HOSPITAL ENCOUNTER (OUTPATIENT)
Dept: CT IMAGING | Age: 75
Discharge: HOME | End: 2020-06-19
Payer: MEDICARE

## 2020-01-01 ENCOUNTER — HOSPITAL ENCOUNTER (OUTPATIENT)
Dept: RADIATION ONCOLOGY | Age: 75
Discharge: HOME | End: 2020-07-14
Attending: RADIOLOGY
Payer: MEDICARE

## 2020-01-01 ENCOUNTER — HOSPITAL ENCOUNTER (OUTPATIENT)
Dept: RADIATION ONCOLOGY | Age: 75
Discharge: HOME | End: 2020-07-08
Attending: RADIOLOGY
Payer: MEDICARE

## 2020-01-01 ENCOUNTER — HOSPITAL ENCOUNTER (OUTPATIENT)
Dept: RADIATION ONCOLOGY | Age: 75
Discharge: HOME | End: 2020-06-29
Attending: RADIOLOGY
Payer: MEDICARE

## 2020-01-01 ENCOUNTER — HOSPITAL ENCOUNTER (OUTPATIENT)
Dept: RADIATION ONCOLOGY | Age: 75
Discharge: HOME | End: 2020-07-13
Attending: RADIOLOGY
Payer: MEDICARE

## 2020-01-01 ENCOUNTER — HOSPITAL ENCOUNTER (OUTPATIENT)
Dept: RADIATION ONCOLOGY | Age: 75
Discharge: HOME | End: 2020-03-25
Payer: MEDICARE

## 2020-01-01 ENCOUNTER — HOSPITAL ENCOUNTER (OUTPATIENT)
Dept: RADIATION ONCOLOGY | Age: 75
Discharge: HOME | End: 2020-07-01
Attending: RADIOLOGY
Payer: MEDICARE

## 2020-01-01 ENCOUNTER — HOSPITAL ENCOUNTER (EMERGENCY)
Age: 75
Discharge: ANOTHER HOSPITAL | End: 2020-01-23
Attending: EMERGENCY MEDICINE
Payer: MEDICARE

## 2020-01-01 ENCOUNTER — HOSPITAL ENCOUNTER (OUTPATIENT)
Dept: RADIATION ONCOLOGY | Age: 75
Discharge: HOME | End: 2020-03-26
Attending: RADIOLOGY
Payer: MEDICARE

## 2020-01-01 ENCOUNTER — HOSPITAL ENCOUNTER (OUTPATIENT)
Dept: RADIATION ONCOLOGY | Age: 75
Discharge: HOME | End: 2020-06-11
Attending: RADIOLOGY
Payer: MEDICARE

## 2020-01-01 ENCOUNTER — VIRTUAL VISIT (OUTPATIENT)
Dept: PALLATIVE CARE | Age: 75
End: 2020-01-01
Payer: MEDICARE

## 2020-01-01 ENCOUNTER — HOSPITAL ENCOUNTER (OUTPATIENT)
Dept: RADIATION ONCOLOGY | Age: 75
Discharge: HOME | End: 2020-07-06
Attending: RADIOLOGY
Payer: MEDICARE

## 2020-01-01 ENCOUNTER — HOSPITAL ENCOUNTER (OUTPATIENT)
Dept: RADIATION ONCOLOGY | Age: 75
Discharge: HOME | End: 2020-07-07
Attending: RADIOLOGY
Payer: MEDICARE

## 2020-01-01 ENCOUNTER — HOSPITAL ENCOUNTER (OUTPATIENT)
Dept: RADIATION ONCOLOGY | Age: 75
Discharge: HOME | End: 2020-06-26
Attending: RADIOLOGY
Payer: MEDICARE

## 2020-01-01 ENCOUNTER — HOSPITAL ENCOUNTER (OUTPATIENT)
Dept: RADIATION ONCOLOGY | Age: 75
Discharge: HOME | End: 2020-07-10
Attending: RADIOLOGY
Payer: MEDICARE

## 2020-01-01 ENCOUNTER — HOSPITAL ENCOUNTER (EMERGENCY)
Age: 75
Discharge: HOME | End: 2020-05-26
Attending: EMERGENCY MEDICINE
Payer: MEDICARE

## 2020-01-01 ENCOUNTER — HOSPITAL ENCOUNTER (OUTPATIENT)
Dept: RADIATION ONCOLOGY | Age: 75
Discharge: HOME | End: 2020-06-30
Attending: RADIOLOGY
Payer: MEDICARE

## 2020-01-01 ENCOUNTER — HOSPITAL ENCOUNTER (OUTPATIENT)
Dept: RADIATION ONCOLOGY | Age: 75
Discharge: HOME | End: 2020-07-16
Attending: RADIOLOGY
Payer: MEDICARE

## 2020-01-01 ENCOUNTER — APPOINTMENT (OUTPATIENT)
Dept: ULTRASOUND IMAGING | Age: 75
End: 2020-01-01
Payer: MEDICARE

## 2020-01-01 ENCOUNTER — HOSPITAL ENCOUNTER (OUTPATIENT)
Dept: RADIATION ONCOLOGY | Age: 75
Discharge: HOME | End: 2020-03-23
Attending: RADIOLOGY
Payer: MEDICARE

## 2020-01-01 ENCOUNTER — HOSPITAL ENCOUNTER (OUTPATIENT)
Dept: RADIATION ONCOLOGY | Age: 75
Discharge: HOME | End: 2020-06-15
Attending: RADIOLOGY
Payer: MEDICARE

## 2020-01-01 ENCOUNTER — HOSPITAL ENCOUNTER (OUTPATIENT)
Dept: RADIATION ONCOLOGY | Age: 75
Discharge: HOME | End: 2020-08-31
Attending: RADIOLOGY
Payer: MEDICARE

## 2020-01-01 ENCOUNTER — HOSPITAL ENCOUNTER (OUTPATIENT)
Dept: RADIATION ONCOLOGY | Age: 75
Discharge: HOME | End: 2020-03-25
Attending: RADIOLOGY
Payer: MEDICARE

## 2020-01-01 ENCOUNTER — HOSPITAL ENCOUNTER (EMERGENCY)
Age: 75
Discharge: HOME | End: 2020-05-08
Attending: EMERGENCY MEDICINE
Payer: MEDICARE

## 2020-01-01 ENCOUNTER — HOSPITAL ENCOUNTER (OUTPATIENT)
Dept: RADIATION ONCOLOGY | Age: 75
Discharge: HOME | End: 2020-07-09
Attending: RADIOLOGY
Payer: MEDICARE

## 2020-01-01 ENCOUNTER — CARE COORDINATION (OUTPATIENT)
Dept: CARE COORDINATION | Age: 75
End: 2020-01-01

## 2020-01-01 ENCOUNTER — TELEPHONE (OUTPATIENT)
Dept: CASE MANAGEMENT | Age: 75
End: 2020-01-01

## 2020-01-01 VITALS
OXYGEN SATURATION: 97 % | RESPIRATION RATE: 16 BRPM | TEMPERATURE: 98 F | HEART RATE: 98 BPM | WEIGHT: 131.3 LBS | HEIGHT: 63 IN | SYSTOLIC BLOOD PRESSURE: 143 MMHG | BODY MASS INDEX: 23.27 KG/M2 | DIASTOLIC BLOOD PRESSURE: 97 MMHG

## 2020-01-01 VITALS
SYSTOLIC BLOOD PRESSURE: 135 MMHG | BODY MASS INDEX: 21.97 KG/M2 | DIASTOLIC BLOOD PRESSURE: 77 MMHG | HEART RATE: 96 BPM | OXYGEN SATURATION: 96 % | WEIGHT: 124 LBS

## 2020-01-01 VITALS
DIASTOLIC BLOOD PRESSURE: 81 MMHG | WEIGHT: 121 LBS | TEMPERATURE: 97.7 F | RESPIRATION RATE: 18 BRPM | OXYGEN SATURATION: 96 % | HEIGHT: 63 IN | SYSTOLIC BLOOD PRESSURE: 148 MMHG | HEART RATE: 83 BPM | BODY MASS INDEX: 21.44 KG/M2

## 2020-01-01 VITALS
HEART RATE: 94 BPM | OXYGEN SATURATION: 98 % | DIASTOLIC BLOOD PRESSURE: 76 MMHG | WEIGHT: 131.2 LBS | BODY MASS INDEX: 23.24 KG/M2 | TEMPERATURE: 96.8 F | SYSTOLIC BLOOD PRESSURE: 128 MMHG

## 2020-01-01 VITALS
TEMPERATURE: 98.1 F | WEIGHT: 131.3 LBS | OXYGEN SATURATION: 98 % | DIASTOLIC BLOOD PRESSURE: 64 MMHG | BODY MASS INDEX: 23.26 KG/M2 | SYSTOLIC BLOOD PRESSURE: 120 MMHG | RESPIRATION RATE: 18 BRPM | HEART RATE: 86 BPM

## 2020-01-01 VITALS
OXYGEN SATURATION: 94 % | WEIGHT: 130 LBS | HEART RATE: 89 BPM | SYSTOLIC BLOOD PRESSURE: 126 MMHG | BODY MASS INDEX: 23.03 KG/M2 | DIASTOLIC BLOOD PRESSURE: 80 MMHG

## 2020-01-01 VITALS
RESPIRATION RATE: 18 BRPM | SYSTOLIC BLOOD PRESSURE: 126 MMHG | HEART RATE: 87 BPM | OXYGEN SATURATION: 96 % | DIASTOLIC BLOOD PRESSURE: 84 MMHG | WEIGHT: 134 LBS | BODY MASS INDEX: 23.74 KG/M2

## 2020-01-01 VITALS
OXYGEN SATURATION: 96 % | BODY MASS INDEX: 23.21 KG/M2 | WEIGHT: 131 LBS | SYSTOLIC BLOOD PRESSURE: 140 MMHG | DIASTOLIC BLOOD PRESSURE: 80 MMHG | HEART RATE: 86 BPM

## 2020-01-01 VITALS
TEMPERATURE: 97.7 F | WEIGHT: 114.4 LBS | HEART RATE: 86 BPM | SYSTOLIC BLOOD PRESSURE: 98 MMHG | DIASTOLIC BLOOD PRESSURE: 62 MMHG | OXYGEN SATURATION: 96 % | BODY MASS INDEX: 20.27 KG/M2 | RESPIRATION RATE: 18 BRPM

## 2020-01-01 VITALS
DIASTOLIC BLOOD PRESSURE: 86 MMHG | HEART RATE: 80 BPM | SYSTOLIC BLOOD PRESSURE: 140 MMHG | BODY MASS INDEX: 20.02 KG/M2 | WEIGHT: 113 LBS | OXYGEN SATURATION: 97 %

## 2020-01-01 VITALS
DIASTOLIC BLOOD PRESSURE: 88 MMHG | BODY MASS INDEX: 23.21 KG/M2 | HEIGHT: 63 IN | HEART RATE: 79 BPM | SYSTOLIC BLOOD PRESSURE: 133 MMHG | OXYGEN SATURATION: 97 % | TEMPERATURE: 98.6 F | RESPIRATION RATE: 18 BRPM | WEIGHT: 131 LBS

## 2020-01-01 VITALS
WEIGHT: 117.9 LBS | SYSTOLIC BLOOD PRESSURE: 110 MMHG | HEART RATE: 88 BPM | RESPIRATION RATE: 18 BRPM | BODY MASS INDEX: 20.89 KG/M2 | DIASTOLIC BLOOD PRESSURE: 70 MMHG | OXYGEN SATURATION: 97 % | TEMPERATURE: 97.1 F

## 2020-01-01 VITALS — BODY MASS INDEX: 23.03 KG/M2 | WEIGHT: 130 LBS

## 2020-01-01 LAB
6AM URINE: <10 NG/ML
ALBUMIN SERPL-MCNC: 3.1 G/DL (ref 3.5–5.2)
ALBUMIN SERPL-MCNC: 3.7 G/DL (ref 3.5–5.2)
ALP BLD-CCNC: 138 U/L (ref 35–104)
ALP BLD-CCNC: 173 U/L (ref 35–104)
ALT SERPL-CCNC: 58 U/L (ref 0–32)
ALT SERPL-CCNC: 75 U/L (ref 0–32)
AMPHETAMINE SCREEN, URINE: NOT DETECTED
ANION GAP SERPL CALCULATED.3IONS-SCNC: 13 MMOL/L (ref 7–16)
ANION GAP SERPL CALCULATED.3IONS-SCNC: 14 MMOL/L (ref 7–16)
ANION GAP SERPL CALCULATED.3IONS-SCNC: 15 MMOL/L (ref 7–16)
ANISOCYTOSIS: ABNORMAL
ANISOCYTOSIS: ABNORMAL
APTT: 23.7 SEC (ref 24.5–35.1)
AST SERPL-CCNC: 49 U/L (ref 0–31)
AST SERPL-CCNC: 58 U/L (ref 0–31)
BARBITURATE SCREEN URINE: NOT DETECTED
BASOPHILS ABSOLUTE: 0 E9/L (ref 0–0.2)
BASOPHILS ABSOLUTE: 0.01 E9/L (ref 0–0.2)
BASOPHILS ABSOLUTE: 0.19 E9/L (ref 0–0.2)
BASOPHILS RELATIVE PERCENT: 0.1 % (ref 0–2)
BASOPHILS RELATIVE PERCENT: 0.5 % (ref 0–2)
BASOPHILS RELATIVE PERCENT: 0.9 % (ref 0–2)
BENZODIAZEPINE SCREEN, URINE: NOT DETECTED
BILIRUB SERPL-MCNC: 0.3 MG/DL (ref 0–1.2)
BILIRUB SERPL-MCNC: 0.5 MG/DL (ref 0–1.2)
BILIRUBIN DIRECT: 0.2 MG/DL (ref 0–0.3)
BILIRUBIN DIRECT: <0.2 MG/DL (ref 0–0.3)
BILIRUBIN, INDIRECT: 0.3 MG/DL (ref 0–1)
BILIRUBIN, INDIRECT: ABNORMAL MG/DL (ref 0–1)
BUN BLDV-MCNC: 13 MG/DL (ref 8–23)
BUN BLDV-MCNC: 14 MG/DL (ref 8–23)
BUN BLDV-MCNC: 30 MG/DL (ref 8–23)
BURR CELLS: ABNORMAL
CALCIUM SERPL-MCNC: 8.1 MG/DL (ref 8.6–10.2)
CALCIUM SERPL-MCNC: 8.2 MG/DL (ref 8.6–10.2)
CALCIUM SERPL-MCNC: 8.4 MG/DL (ref 8.6–10.2)
CANNABINOID SCREEN URINE: NOT DETECTED
CHLORIDE BLD-SCNC: 103 MMOL/L (ref 98–107)
CHLORIDE BLD-SCNC: 104 MMOL/L (ref 98–107)
CHLORIDE BLD-SCNC: 96 MMOL/L (ref 98–107)
CO2: 23 MMOL/L (ref 22–29)
CO2: 23 MMOL/L (ref 22–29)
CO2: 26 MMOL/L (ref 22–29)
COCAINE METABOLITE SCREEN URINE: NOT DETECTED
CODEINE, URINE: <20 NG/ML
CREAT SERPL-MCNC: 0.6 MG/DL (ref 0.5–1)
CREAT SERPL-MCNC: 0.6 MG/DL (ref 0.5–1)
CREAT SERPL-MCNC: 0.7 MG/DL (ref 0.5–1)
EKG ATRIAL RATE: 81 BPM
EKG ATRIAL RATE: 91 BPM
EKG P AXIS: 36 DEGREES
EKG P AXIS: 36 DEGREES
EKG P-R INTERVAL: 128 MS
EKG P-R INTERVAL: 146 MS
EKG Q-T INTERVAL: 380 MS
EKG Q-T INTERVAL: 396 MS
EKG QRS DURATION: 72 MS
EKG QRS DURATION: 78 MS
EKG QTC CALCULATION (BAZETT): 460 MS
EKG QTC CALCULATION (BAZETT): 467 MS
EKG R AXIS: 12 DEGREES
EKG R AXIS: 8 DEGREES
EKG T AXIS: 29 DEGREES
EKG T AXIS: 31 DEGREES
EKG VENTRICULAR RATE: 81 BPM
EKG VENTRICULAR RATE: 91 BPM
EOSINOPHILS ABSOLUTE: 0 E9/L (ref 0.05–0.5)
EOSINOPHILS ABSOLUTE: 0 E9/L (ref 0.05–0.5)
EOSINOPHILS ABSOLUTE: 0.01 E9/L (ref 0.05–0.5)
EOSINOPHILS RELATIVE PERCENT: 0 % (ref 0–6)
EOSINOPHILS RELATIVE PERCENT: 0.1 % (ref 0–6)
EOSINOPHILS RELATIVE PERCENT: 55.8 % (ref 0–6)
FENTANYL SCREEN, URINE: NOT DETECTED
GFR AFRICAN AMERICAN: >60
GFR NON-AFRICAN AMERICAN: >60 ML/MIN/1.73
GLUCOSE BLD-MCNC: 153 MG/DL (ref 74–99)
GLUCOSE BLD-MCNC: 181 MG/DL (ref 74–99)
GLUCOSE BLD-MCNC: 188 MG/DL (ref 74–99)
HCT VFR BLD CALC: 34.1 % (ref 34–48)
HCT VFR BLD CALC: 35.9 % (ref 34–48)
HCT VFR BLD CALC: 42.7 % (ref 34–48)
HEMOGLOBIN: 11.2 G/DL (ref 11.5–15.5)
HEMOGLOBIN: 11.8 G/DL (ref 11.5–15.5)
HEMOGLOBIN: 14 G/DL (ref 11.5–15.5)
HYDROCODONE, URINE: <20 NG/ML
HYDROMORPHONE, URINE: <20 NG/ML
IMMATURE GRANULOCYTES #: 0.07 E9/L
IMMATURE GRANULOCYTES %: 0.8 % (ref 0–5)
INR BLD: 1.1
LYMPHOCYTES ABSOLUTE: 0.89 E9/L (ref 1.5–4)
LYMPHOCYTES ABSOLUTE: 1.24 E9/L (ref 1.5–4)
LYMPHOCYTES ABSOLUTE: 2.93 E9/L (ref 1.5–4)
LYMPHOCYTES RELATIVE PERCENT: 34 % (ref 20–42)
LYMPHOCYTES RELATIVE PERCENT: 6.1 % (ref 20–42)
LYMPHOCYTES RELATIVE PERCENT: 7 % (ref 20–42)
Lab: ABNORMAL
MAGNESIUM: 1.2 MG/DL (ref 1.6–2.6)
MAGNESIUM: 1.9 MG/DL (ref 1.6–2.6)
MCH RBC QN AUTO: 29.5 PG (ref 26–35)
MCH RBC QN AUTO: 30.3 PG (ref 26–35)
MCH RBC QN AUTO: 31.6 PG (ref 26–35)
MCHC RBC AUTO-ENTMCNC: 32.8 % (ref 32–34.5)
MCHC RBC AUTO-ENTMCNC: 32.8 % (ref 32–34.5)
MCHC RBC AUTO-ENTMCNC: 32.9 % (ref 32–34.5)
MCV RBC AUTO: 89.9 FL (ref 80–99.9)
MCV RBC AUTO: 92.2 FL (ref 80–99.9)
MCV RBC AUTO: 96.2 FL (ref 80–99.9)
METAMYELOCYTES RELATIVE PERCENT: 5.2 % (ref 0–1)
METHADONE SCREEN, URINE: NOT DETECTED
MONOCYTES ABSOLUTE: 0.11 E9/L (ref 0.1–0.95)
MONOCYTES ABSOLUTE: 0.21 E9/L (ref 0.1–0.95)
MONOCYTES ABSOLUTE: 0.25 E9/L (ref 0.1–0.95)
MONOCYTES RELATIVE PERCENT: 0.9 % (ref 2–12)
MONOCYTES RELATIVE PERCENT: 1.3 % (ref 2–12)
MONOCYTES RELATIVE PERCENT: 1.7 % (ref 2–12)
MORPHINE URINE: <20 NG/ML
MYELOCYTE PERCENT: 7.8 % (ref 0–0)
NEUTROPHILS ABSOLUTE: 11.56 E9/L (ref 1.8–7.3)
NEUTROPHILS ABSOLUTE: 19.04 E9/L (ref 1.8–7.3)
NEUTROPHILS ABSOLUTE: 5.49 E9/L (ref 1.8–7.3)
NEUTROPHILS RELATIVE PERCENT: 63.7 % (ref 43–80)
NEUTROPHILS RELATIVE PERCENT: 78.3 % (ref 43–80)
NEUTROPHILS RELATIVE PERCENT: 92.2 % (ref 43–80)
NORHYDROCODONE, URINE: <20 NG/ML
NOROXYCODONE, URINE: >4000 NG/ML
NOROXYMORPHONE, URINE: 408 NG/ML
NUCLEATED RED BLOOD CELLS: 2.6 /100 WBC
OPIATE SCREEN URINE: NOT DETECTED
OVALOCYTES: ABNORMAL
OVALOCYTES: ABNORMAL
OXYCODONE URINE: POSITIVE
OXYCODONE, URINE CONFIRMATION: 3170 NG/ML
OXYMORPHONE, URINE: 24 NG/ML
PDW BLD-RTO: 18.2 FL (ref 11.5–15)
PDW BLD-RTO: 18.6 FL (ref 11.5–15)
PDW BLD-RTO: 23.9 FL (ref 11.5–15)
PHENCYCLIDINE SCREEN URINE: NOT DETECTED
PLATELET # BLD: 121 E9/L (ref 130–450)
PLATELET # BLD: 151 E9/L (ref 130–450)
PLATELET # BLD: 196 E9/L (ref 130–450)
PMV BLD AUTO: 10.4 FL (ref 7–12)
PMV BLD AUTO: 12.2 FL (ref 7–12)
PMV BLD AUTO: 12.8 FL (ref 7–12)
POIKILOCYTES: ABNORMAL
POIKILOCYTES: ABNORMAL
POLYCHROMASIA: ABNORMAL
POLYCHROMASIA: ABNORMAL
POTASSIUM REFLEX MAGNESIUM: 2.6 MMOL/L (ref 3.5–5)
POTASSIUM REFLEX MAGNESIUM: 3.2 MMOL/L (ref 3.5–5)
POTASSIUM REFLEX MAGNESIUM: 4.6 MMOL/L (ref 3.5–5)
PRO-BNP: 513 PG/ML (ref 0–125)
PRO-BNP: 626 PG/ML (ref 0–125)
PROTHROMBIN TIME: 12.2 SEC (ref 9.3–12.4)
RBC # BLD: 3.7 E12/L (ref 3.5–5.5)
RBC # BLD: 3.73 E12/L (ref 3.5–5.5)
RBC # BLD: 4.75 E12/L (ref 3.5–5.5)
SODIUM BLD-SCNC: 136 MMOL/L (ref 132–146)
SODIUM BLD-SCNC: 139 MMOL/L (ref 132–146)
SODIUM BLD-SCNC: 142 MMOL/L (ref 132–146)
TARGET CELLS: ABNORMAL
TARGET CELLS: ABNORMAL
TEAR DROP CELLS: ABNORMAL
TEAR DROP CELLS: ABNORMAL
TOTAL PROTEIN: 5.8 G/DL (ref 6.4–8.3)
TOTAL PROTEIN: 6.1 G/DL (ref 6.4–8.3)
TOXIC GRANULATION: ABNORMAL
TROPONIN: <0.01 NG/ML (ref 0–0.03)
TROPONIN: <0.01 NG/ML (ref 0–0.03)
WBC # BLD: 12.7 E9/L (ref 4.5–11.5)
WBC # BLD: 20.7 E9/L (ref 4.5–11.5)
WBC # BLD: 8.6 E9/L (ref 4.5–11.5)

## 2020-01-01 PROCEDURE — 77334 RADIATION TREATMENT AID(S): CPT | Performed by: RADIOLOGY

## 2020-01-01 PROCEDURE — 77014 PR CT GUIDANCE PLACEMENT RAD THERAPY FIELDS: CPT | Performed by: RADIOLOGY

## 2020-01-01 PROCEDURE — 77412 RADIATION TX DELIVERY LVL 3: CPT | Performed by: RADIOLOGY

## 2020-01-01 PROCEDURE — 77387 GUIDANCE FOR RADJ TX DLVR: CPT | Performed by: RADIOLOGY

## 2020-01-01 PROCEDURE — 99211 OFF/OP EST MAY X REQ PHY/QHP: CPT | Performed by: STUDENT IN AN ORGANIZED HEALTH CARE EDUCATION/TRAINING PROGRAM

## 2020-01-01 PROCEDURE — 77427 RADIATION TX MANAGEMENT X5: CPT | Performed by: RADIOLOGY

## 2020-01-01 PROCEDURE — 6360000002 HC RX W HCPCS: Performed by: STUDENT IN AN ORGANIZED HEALTH CARE EDUCATION/TRAINING PROGRAM

## 2020-01-01 PROCEDURE — 83880 ASSAY OF NATRIURETIC PEPTIDE: CPT

## 2020-01-01 PROCEDURE — 93971 EXTREMITY STUDY: CPT

## 2020-01-01 PROCEDURE — 77290 THER RAD SIMULAJ FIELD CPLX: CPT | Performed by: RADIOLOGY

## 2020-01-01 PROCEDURE — 83735 ASSAY OF MAGNESIUM: CPT

## 2020-01-01 PROCEDURE — 99999 PR OFFICE/OUTPT VISIT,PROCEDURE ONLY: CPT | Performed by: RADIOLOGY

## 2020-01-01 PROCEDURE — 6370000000 HC RX 637 (ALT 250 FOR IP): Performed by: STUDENT IN AN ORGANIZED HEALTH CARE EDUCATION/TRAINING PROGRAM

## 2020-01-01 PROCEDURE — 71260 CT THORAX DX C+: CPT

## 2020-01-01 PROCEDURE — 80048 BASIC METABOLIC PNL TOTAL CA: CPT

## 2020-01-01 PROCEDURE — 70470 CT HEAD/BRAIN W/O & W/DYE: CPT

## 2020-01-01 PROCEDURE — 99442 PR PHYS/QHP TELEPHONE EVALUATION 11-20 MIN: CPT | Performed by: RADIOLOGY

## 2020-01-01 PROCEDURE — 84484 ASSAY OF TROPONIN QUANT: CPT

## 2020-01-01 PROCEDURE — 77263 THER RADIOLOGY TX PLNG CPLX: CPT | Performed by: RADIOLOGY

## 2020-01-01 PROCEDURE — 99212 OFFICE O/P EST SF 10 MIN: CPT

## 2020-01-01 PROCEDURE — 99213 OFFICE O/P EST LOW 20 MIN: CPT | Performed by: STUDENT IN AN ORGANIZED HEALTH CARE EDUCATION/TRAINING PROGRAM

## 2020-01-01 PROCEDURE — 99212 OFFICE O/P EST SF 10 MIN: CPT | Performed by: NURSE PRACTITIONER

## 2020-01-01 PROCEDURE — 99285 EMERGENCY DEPT VISIT HI MDM: CPT

## 2020-01-01 PROCEDURE — 77336 RADIATION PHYSICS CONSULT: CPT | Performed by: RADIOLOGY

## 2020-01-01 PROCEDURE — 99215 OFFICE O/P EST HI 40 MIN: CPT | Performed by: NURSE PRACTITIONER

## 2020-01-01 PROCEDURE — 96374 THER/PROPH/DIAG INJ IV PUSH: CPT

## 2020-01-01 PROCEDURE — 77300 RADIATION THERAPY DOSE PLAN: CPT | Performed by: RADIOLOGY

## 2020-01-01 PROCEDURE — 85025 COMPLETE CBC W/AUTO DIFF WBC: CPT

## 2020-01-01 PROCEDURE — 80307 DRUG TEST PRSMV CHEM ANLYZR: CPT

## 2020-01-01 PROCEDURE — 36415 COLL VENOUS BLD VENIPUNCTURE: CPT

## 2020-01-01 PROCEDURE — 77295 3-D RADIOTHERAPY PLAN: CPT | Performed by: RADIOLOGY

## 2020-01-01 PROCEDURE — 99442 PR PHYS/QHP TELEPHONE EVALUATION 11-20 MIN: CPT | Performed by: NURSE PRACTITIONER

## 2020-01-01 PROCEDURE — 99204 OFFICE O/P NEW MOD 45 MIN: CPT | Performed by: NURSE PRACTITIONER

## 2020-01-01 PROCEDURE — 6360000004 HC RX CONTRAST MEDICATION: Performed by: RADIOLOGY

## 2020-01-01 PROCEDURE — 70491 CT SOFT TISSUE NECK W/DYE: CPT

## 2020-01-01 PROCEDURE — 2580000003 HC RX 258: Performed by: RADIOLOGY

## 2020-01-01 PROCEDURE — 93005 ELECTROCARDIOGRAM TRACING: CPT | Performed by: EMERGENCY MEDICINE

## 2020-01-01 PROCEDURE — 74177 CT ABD & PELVIS W/CONTRAST: CPT

## 2020-01-01 PROCEDURE — 99205 OFFICE O/P NEW HI 60 MIN: CPT | Performed by: RADIOLOGY

## 2020-01-01 PROCEDURE — 6370000000 HC RX 637 (ALT 250 FOR IP): Performed by: EMERGENCY MEDICINE

## 2020-01-01 PROCEDURE — 85730 THROMBOPLASTIN TIME PARTIAL: CPT

## 2020-01-01 PROCEDURE — 71046 X-RAY EXAM CHEST 2 VIEWS: CPT

## 2020-01-01 PROCEDURE — 80076 HEPATIC FUNCTION PANEL: CPT

## 2020-01-01 PROCEDURE — 77370 RADIATION PHYSICS CONSULT: CPT | Performed by: RADIOLOGY

## 2020-01-01 PROCEDURE — 99999 PR OFFICE/OUTPT VISIT,PROCEDURE ONLY: CPT | Performed by: NURSE PRACTITIONER

## 2020-01-01 PROCEDURE — 99205 OFFICE O/P NEW HI 60 MIN: CPT

## 2020-01-01 PROCEDURE — 99205 OFFICE O/P NEW HI 60 MIN: CPT | Performed by: NURSE PRACTITIONER

## 2020-01-01 PROCEDURE — 93005 ELECTROCARDIOGRAM TRACING: CPT | Performed by: STUDENT IN AN ORGANIZED HEALTH CARE EDUCATION/TRAINING PROGRAM

## 2020-01-01 PROCEDURE — 85610 PROTHROMBIN TIME: CPT

## 2020-01-01 PROCEDURE — G0480 DRUG TEST DEF 1-7 CLASSES: HCPCS

## 2020-01-01 PROCEDURE — 99284 EMERGENCY DEPT VISIT MOD MDM: CPT

## 2020-01-01 PROCEDURE — 94640 AIRWAY INHALATION TREATMENT: CPT

## 2020-01-01 PROCEDURE — 77307 TELETHX ISODOSE PLAN CPLX: CPT | Performed by: RADIOLOGY

## 2020-01-01 PROCEDURE — 93010 ELECTROCARDIOGRAM REPORT: CPT | Performed by: INTERNAL MEDICINE

## 2020-01-01 PROCEDURE — 96375 TX/PRO/DX INJ NEW DRUG ADDON: CPT

## 2020-01-01 RX ORDER — SENNA PLUS 8.6 MG/1
1 TABLET ORAL DAILY PRN
COMMUNITY
End: 2020-01-01 | Stop reason: ALTCHOICE

## 2020-01-01 RX ORDER — ALENDRONATE SODIUM 70 MG/1
70 TABLET ORAL
COMMUNITY

## 2020-01-01 RX ORDER — SODIUM CHLORIDE 0.9 % (FLUSH) 0.9 %
10 SYRINGE (ML) INJECTION
Status: COMPLETED | OUTPATIENT
Start: 2020-01-01 | End: 2020-01-01

## 2020-01-01 RX ORDER — GABAPENTIN 250 MG/5ML
SOLUTION ORAL
Qty: 473 ML | Refills: 3 | Status: SHIPPED
Start: 2020-01-01 | End: 2020-01-01 | Stop reason: DRUGHIGH

## 2020-01-01 RX ORDER — METHYLPHENIDATE HYDROCHLORIDE 5 MG/1
5 TABLET ORAL 2 TIMES DAILY
Qty: 60 TABLET | Refills: 0 | Status: SHIPPED | OUTPATIENT
Start: 2020-01-01 | End: 2020-11-26

## 2020-01-01 RX ORDER — FUROSEMIDE 10 MG/ML
20 INJECTION INTRAMUSCULAR; INTRAVENOUS ONCE
Status: COMPLETED | OUTPATIENT
Start: 2020-01-01 | End: 2020-01-01

## 2020-01-01 RX ORDER — GABAPENTIN 250 MG/5ML
500 SOLUTION ORAL 2 TIMES DAILY
Qty: 600 ML | Refills: 0 | Status: SHIPPED
Start: 2020-01-01 | End: 2020-01-01 | Stop reason: SDUPTHER

## 2020-01-01 RX ORDER — HYDROCODONE BITARTRATE AND ACETAMINOPHEN 10; 325 MG/1; MG/1
1 TABLET ORAL EVERY 4 HOURS PRN
Qty: 42 TABLET | Refills: 0 | Status: SHIPPED
Start: 2020-01-01 | End: 2020-01-01 | Stop reason: SDUPTHER

## 2020-01-01 RX ORDER — POTASSIUM CHLORIDE 20 MEQ/1
20 TABLET, EXTENDED RELEASE ORAL 2 TIMES DAILY
Qty: 6 TABLET | Refills: 0 | Status: SHIPPED | OUTPATIENT
Start: 2020-01-01 | End: 2020-01-01

## 2020-01-01 RX ORDER — SCOLOPAMINE TRANSDERMAL SYSTEM 1 MG/1
1 PATCH, EXTENDED RELEASE TRANSDERMAL
Qty: 10 PATCH | Refills: 0 | Status: SHIPPED | OUTPATIENT
Start: 2020-01-01 | End: 2020-01-01

## 2020-01-01 RX ORDER — DOCUSATE SODIUM 100 MG/1
100 CAPSULE, LIQUID FILLED ORAL 2 TIMES DAILY PRN
COMMUNITY

## 2020-01-01 RX ORDER — DEXAMETHASONE 4 MG/1
4 TABLET ORAL
COMMUNITY
End: 2020-01-01 | Stop reason: ALTCHOICE

## 2020-01-01 RX ORDER — MEGESTROL ACETATE 40 MG/ML
400 SUSPENSION ORAL DAILY
Qty: 300 ML | Refills: 0 | Status: SHIPPED
Start: 2020-01-01 | End: 2020-01-01 | Stop reason: ALTCHOICE

## 2020-01-01 RX ORDER — ONDANSETRON HYDROCHLORIDE 8 MG/1
8 TABLET, FILM COATED ORAL EVERY 8 HOURS PRN
COMMUNITY

## 2020-01-01 RX ORDER — GABAPENTIN 250 MG/5ML
SOLUTION ORAL
Qty: 750 ML | Refills: 2 | Status: SHIPPED | OUTPATIENT
Start: 2020-01-01 | End: 2021-01-10

## 2020-01-01 RX ORDER — METHYLPHENIDATE HYDROCHLORIDE 5 MG/1
5 TABLET ORAL DAILY
Qty: 30 TABLET | Refills: 0 | Status: SHIPPED
Start: 2020-01-01 | End: 2020-01-01 | Stop reason: SDUPTHER

## 2020-01-01 RX ORDER — HYDROCODONE BITARTRATE AND ACETAMINOPHEN 10; 325 MG/1; MG/1
1 TABLET ORAL EVERY 6 HOURS PRN
Qty: 120 TABLET | Refills: 0 | Status: SHIPPED | OUTPATIENT
Start: 2020-01-01 | End: 2020-01-01

## 2020-01-01 RX ORDER — OXYCODONE HYDROCHLORIDE 10 MG/1
10 TABLET ORAL EVERY 4 HOURS PRN
Qty: 180 TABLET | Refills: 0 | Status: SHIPPED
Start: 2020-01-01 | End: 2020-01-01 | Stop reason: SDUPTHER

## 2020-01-01 RX ORDER — HYDROCODONE BITARTRATE AND ACETAMINOPHEN 10; 325 MG/1; MG/1
1 TABLET ORAL EVERY 6 HOURS PRN
Qty: 120 TABLET | Refills: 0 | Status: SHIPPED
Start: 2020-01-01 | End: 2020-01-01 | Stop reason: SDUPTHER

## 2020-01-01 RX ORDER — ACETAMINOPHEN 325 MG/1
650 TABLET ORAL EVERY 6 HOURS PRN
COMMUNITY
End: 2020-01-01 | Stop reason: ALTCHOICE

## 2020-01-01 RX ORDER — SODIUM CHLORIDE FOR INHALATION 0.9 %
3 VIAL, NEBULIZER (ML) INHALATION EVERY 4 HOURS PRN
Status: DISCONTINUED | OUTPATIENT
Start: 2020-01-01 | End: 2020-01-01

## 2020-01-01 RX ORDER — MAGNESIUM OXIDE 400 MG/1
400 TABLET ORAL ONCE
Status: DISCONTINUED | OUTPATIENT
Start: 2020-01-01 | End: 2020-01-01 | Stop reason: CLARIF

## 2020-01-01 RX ORDER — AMOXICILLIN 250 MG
2 CAPSULE ORAL 2 TIMES DAILY
Qty: 120 TABLET | Refills: 2 | Status: SHIPPED | OUTPATIENT
Start: 2020-01-01 | End: 2020-01-01

## 2020-01-01 RX ORDER — DIPHENHYDRAMINE HYDROCHLORIDE 50 MG/ML
25 INJECTION INTRAMUSCULAR; INTRAVENOUS ONCE
Status: COMPLETED | OUTPATIENT
Start: 2020-01-01 | End: 2020-01-01

## 2020-01-01 RX ORDER — MAGNESIUM CHLORIDE 71.5 G/G
2 TABLET ORAL DAILY
Qty: 6 TABLET | Refills: 0 | Status: SHIPPED | OUTPATIENT
Start: 2020-01-01 | End: 2020-01-01

## 2020-01-01 RX ORDER — HYDROCODONE BITARTRATE AND ACETAMINOPHEN 10; 325 MG/1; MG/1
1 TABLET ORAL EVERY 6 HOURS PRN
COMMUNITY
End: 2020-01-01 | Stop reason: ALTCHOICE

## 2020-01-01 RX ORDER — OXYCODONE HYDROCHLORIDE 10 MG/1
10 TABLET ORAL EVERY 6 HOURS PRN
Qty: 120 TABLET | Refills: 0 | Status: SHIPPED
Start: 2020-01-01 | End: 2020-01-01

## 2020-01-01 RX ORDER — HYDROCODONE BITARTRATE AND ACETAMINOPHEN 10; 325 MG/1; MG/1
1 TABLET ORAL EVERY 4 HOURS PRN
Qty: 42 TABLET | Refills: 0 | Status: SHIPPED | OUTPATIENT
Start: 2020-01-01 | End: 2020-01-01

## 2020-01-01 RX ORDER — GABAPENTIN 250 MG/5ML
500 SOLUTION ORAL NIGHTLY
COMMUNITY
End: 2020-01-01 | Stop reason: SDUPTHER

## 2020-01-01 RX ORDER — OXYCODONE HYDROCHLORIDE 10 MG/1
10 TABLET ORAL EVERY 4 HOURS PRN
Qty: 180 TABLET | Refills: 0 | Status: SHIPPED | OUTPATIENT
Start: 2020-01-01 | End: 2020-01-01

## 2020-01-01 RX ORDER — DEXAMETHASONE 4 MG/1
4 TABLET ORAL 2 TIMES DAILY WITH MEALS
COMMUNITY

## 2020-01-01 RX ORDER — PROCHLORPERAZINE MALEATE 5 MG/1
5 TABLET ORAL EVERY 6 HOURS PRN
COMMUNITY
End: 2020-01-01 | Stop reason: ALTCHOICE

## 2020-01-01 RX ORDER — METHYLPREDNISOLONE SODIUM SUCCINATE 125 MG/2ML
125 INJECTION, POWDER, LYOPHILIZED, FOR SOLUTION INTRAMUSCULAR; INTRAVENOUS DAILY
Status: DISCONTINUED | OUTPATIENT
Start: 2020-01-01 | End: 2020-01-01 | Stop reason: HOSPADM

## 2020-01-01 RX ORDER — POTASSIUM CHLORIDE 20 MEQ/1
40 TABLET, EXTENDED RELEASE ORAL ONCE
Status: COMPLETED | OUTPATIENT
Start: 2020-01-01 | End: 2020-01-01

## 2020-01-01 RX ORDER — LANOLIN ALCOHOL/MO/W.PET/CERES
400 CREAM (GRAM) TOPICAL ONCE
Status: COMPLETED | OUTPATIENT
Start: 2020-01-01 | End: 2020-01-01

## 2020-01-01 RX ADMIN — Medication 10 ML: at 11:43

## 2020-01-01 RX ADMIN — IOPAMIDOL 90 ML: 755 INJECTION, SOLUTION INTRAVENOUS at 11:43

## 2020-01-01 RX ADMIN — DIPHENHYDRAMINE HYDROCHLORIDE 25 MG: 50 INJECTION INTRAMUSCULAR; INTRAVENOUS at 15:52

## 2020-01-01 RX ADMIN — IOPAMIDOL 75 ML: 755 INJECTION, SOLUTION INTRAVENOUS at 16:53

## 2020-01-01 RX ADMIN — RACEPINEPHRINE HYDROCHLORIDE 11.25 MG: 11.25 SOLUTION RESPIRATORY (INHALATION) at 16:10

## 2020-01-01 RX ADMIN — POTASSIUM CHLORIDE 40 MEQ: 20 TABLET, EXTENDED RELEASE ORAL at 17:13

## 2020-01-01 RX ADMIN — MAGNESIUM CITRATE 296 ML: 1.75 LIQUID ORAL at 17:15

## 2020-01-01 RX ADMIN — IOPAMIDOL 75 ML: 755 INJECTION, SOLUTION INTRAVENOUS at 16:08

## 2020-01-01 RX ADMIN — IOPAMIDOL 80 ML: 755 INJECTION, SOLUTION INTRAVENOUS at 20:20

## 2020-01-01 RX ADMIN — FUROSEMIDE 20 MG: 10 INJECTION, SOLUTION INTRAMUSCULAR; INTRAVENOUS at 17:02

## 2020-01-01 RX ADMIN — POTASSIUM BICARBONATE 40 MEQ: 782 TABLET, EFFERVESCENT ORAL at 16:18

## 2020-01-01 RX ADMIN — Medication 400 MG: at 18:08

## 2020-01-01 RX ADMIN — METHYLPREDNISOLONE SODIUM SUCCINATE 125 MG: 125 INJECTION, POWDER, FOR SOLUTION INTRAMUSCULAR; INTRAVENOUS at 15:52

## 2020-01-01 ASSESSMENT — ENCOUNTER SYMPTOMS
VOMITING: 0
SORE THROAT: 0
DIARRHEA: 0
CHEST TIGHTNESS: 0
BACK PAIN: 0
ABDOMINAL PAIN: 0
EYE PAIN: 0
SORE THROAT: 0
VOMITING: 0
STRIDOR: 1
DIARRHEA: 0
SINUS PAIN: 0
FACIAL SWELLING: 0
ANAL BLEEDING: 0
VOMITING: 0
DIARRHEA: 0
CHEST TIGHTNESS: 0
WHEEZING: 0
WHEEZING: 0
CONSTIPATION: 0
SHORTNESS OF BREATH: 0
SHORTNESS OF BREATH: 0
BACK PAIN: 0
EYE REDNESS: 0
EYE DISCHARGE: 0
EYE PAIN: 0
EYE PAIN: 0
ABDOMINAL DISTENTION: 1
COUGH: 0
COUGH: 0
NAUSEA: 0
EYE REDNESS: 0
TROUBLE SWALLOWING: 0
SINUS PRESSURE: 0
ABDOMINAL DISTENTION: 0
CHOKING: 0
ABDOMINAL PAIN: 0
NAUSEA: 0
CONSTIPATION: 0
EYE DISCHARGE: 0
ABDOMINAL PAIN: 0
SINUS PAIN: 0
SINUS PRESSURE: 0
NAUSEA: 0
VOICE CHANGE: 0
SORE THROAT: 0
APNEA: 0
WHEEZING: 0
SHORTNESS OF BREATH: 0
COUGH: 0

## 2020-01-23 NOTE — ED NOTES
Facial swelling noted extending to neck. The pt has [place] Palsy right face since ! 2/19. No other neuro deficits noted.        Shobha Salmeron RN  01/23/20 [id number]

## 2020-01-24 NOTE — PROGRESS NOTES
Subjective:  Gillian Oropeza was seen and examined in the ED today. The patient's questions were answered and tests were reviewed. Pt came to [facility] ED from the [facility] AT [facility] b/c of right sided facial swelling. Pt is currently undergoing chemo for cancer through a right sided mediport and was dx with Right sided bells palsy about a month ago. Pt states that they have been using the mediport from has noticed some swelling around it which worsened today so she came to the ED. Objective:  BP (!) 139/93   Pulse 83   Temp 97.7 °F (36.5 °C) (Oral)   Resp 20   Ht 5' 3\" (1.6 m)   Wt 121 lb (54.9 kg)   SpO2 96%   BMI 21.43 kg/m²   No intake/output data recorded. No intake/output data recorded. AAO x 3, currently in NAD  Right facial droop c/w [place] palsy  There is swelling going up the neck but according to pt and family member it has decreased since arriving to the ED  RRR, pos S1, S2  Right sided mediport   CTA bilaterally, no wheeze, rales or rhonchi  bowel sounds present, nontender, nondistended  No clubbing, cyanosis, or edema  No neuro changes   No obvious rashes or lesions. Recent Labs     01/23/20 1930   WBC 12.7*   HGB 11.8   *     Recent Labs     01/23/20 1930      K 4.6      CO2 23   BUN 14   CREATININE 0.6   GLUCOSE 153*     Recent Labs     01/23/20 1930   BILITOT 0.3   ALKPHOS 138*   AST 49*   ALT 58*     No results for input(s): INR in the last 72 hours. Invalid input(s): PT  No results for input(s): CKTOTAL, CKMB, CKMBINDEX, TROPONINI in the last 72 hours. Ct Chest W Contrast    Result Date: 1/23/2020  LOCATION:200 EXAM: CT CHEST W CONTRAST COMPARISON: None HISTORY: Dysphagia TECHNIQUE: Contrast-enhanced helical chest CT was performed. Coronal and sagittal reconstructions also obtained. Automated dose control was used for this exam. CONTRAST: 80 mL Isovue-370 intravenous contrast was administered.  FINDINGS: SUPPORT DEVICES: Right IJ catheter terminates in the SVC LUNGS/CENTRAL AIRWAYS/PLEURA: The lungs are clear. No nodules or opacities are identified. Central airways are patent. HEART/PERICARDIUM/GREAT VESSELS: Cardiac size is normal. There is no pericardial effusion. There is heterogeneous decreased density seen at the tip of the IJ catheter compatible with thrombus. The thrombus appears to be near occlusive. Prominent collateralized venous structures seen in the mediastinum and along paralumbar vasculature. LYMPH NODES: No thoracic adenopathy by size criteria. NECK BASE/CHEST WALL/DIAPHRAGM: No soft tissue lesions or diaphragmatic abnormality. UPPER ABDOMEN: Multiple allergies enhancing masses throughout the liver with a large mass in the right hepatic lobe measuring 8 cm compatible with metastases. OSSEOUS STRUCTURES: No suspicious lytic or blastic lesions. No fractures. 1. Thrombus at the tip of the catheter with near occlusion of the SVC. 2. Collateralization of multiple mediastinal vessels. 3. Multiple liver metastases. Us Dup Upper Extremity Right Venous    Result Date: 1/23/2020  LOCATION: 200 EXAM: US DUP UPPER EXTREMITY RIGHT VENOUS COMPARISON: None HISTORY: Pain and swelling Technique:  Multiple Realtime, grayscale and color-flow spectral waveform analysis Doppler ultrasound images of the right upper extremity was performed. Evaluation was obtained from the forearm to the neck. Compression technique as well as doppler and color flow images were obtained. Findings: Normal flow and color flow patterns were identified with normal changes to  venous augmentation. Complete compression of the venous system was also noted signifying no evidence of acute vein thrombosis. No soft tissue mass or cyst is identified. 1. No evidence of acute venous thrombosis right upper extremity.     Assessment:  Eva Avalos is a 69y.o. year old female who presented on 1/23/2020 and is being treated for:  SVC Syndrome  [place] Palsy  Liver/Lung Cancer     Plan  There is no vascular surgeon at [facility] for the next few days  D/w pt/family - will transfer pt to [facility] under my service for vascular evaluation    Mariah Stevens MD  8:44 PM  1/23/2020

## 2020-01-24 NOTE — ED NOTES
Nurse to nurse report given to [facility] on the 4th Floor of [address] [facility], [address] [place] [address]  01/23/20 [id number]

## 2020-01-24 NOTE — ED PROVIDER NOTES
Patient is a 69-year-old female with past medical history significant for lung and liver cancer on chemo who presents the ER today with chief complaint of facial swelling and dysphasia. Patient states that she got Bell's palsy in December and has been dealing with that ever since. States that she had some mild facial swelling since December but today she was at the [facility] getting her chemotherapy and after she was done she felt that her whole upper body was swollen on the right side including her face. Also had some issues with swallowing secondary to the swelling. She states that the swelling is still currently ongoing. She denies any headache, dizziness, chest pain, nausea, vomiting, dumping, diarrhea, constipation, fever, chills, urinary symptoms, or pain associated the swelling, however she does state that her skin feels tight. The history is provided by the patient. Review of Systems   Constitutional: Negative for chills and fatigue. HENT: Negative for drooling, mouth sores, sinus pain and sore throat. Eyes: Negative for pain and visual disturbance. Respiratory: Negative for cough, chest tightness, shortness of breath and wheezing. Cardiovascular: Negative for chest pain and palpitations. Gastrointestinal: Negative for abdominal pain, constipation, diarrhea, nausea and vomiting. Genitourinary: Negative for dysuria and hematuria. Musculoskeletal: Negative for gait problem. Neurological: Negative for headaches. Psychiatric/Behavioral: Negative for confusion. Physical Exam  Exam conducted with a chaperone present. Constitutional:       General: She is awake. Appearance: Normal appearance. She is well-developed and normal weight. HENT:      Head: Normocephalic and atraumatic. Comments: She has right-sided Bell's palsy, there is also generalized swelling to the right half of patient's face. This extends down into the neck.      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 CT did show that patient indeed has a clot at the end of her port which is causing cc syndrome. Lab work otherwise within normal limits. I spoke to Dr. Emerald Man who came and saw the patient. He agrees that patient needs to be admitted to vascular surgery. Our vascular surgeon, however is on occasion and patient will be transferred to [facility].  I spoken to Dr. Shaista Dave who is the accepting ER physician at [facility], he has communicated with her vascular surgeon and he will except the patient. ED Course as of Jan 23 2336 Thu Jan 23, 2020   1843 ATTENDING PROVIDER ATTESTATION:     Tonny Martinez presented to the emergency department for evaluation of right upper extremity/face swelling and was initially evaluated by the Resident. See Original ED Note for H&P and ED course above. I have reviewed and discussed the case, including pertinent history (medical, surgical, family and social) and exam findings with the Resident assigned to Tonny Martinez. I have personally performed and/or participated in the history, exam, medical decision making, and procedures and agree with all pertinent clinical information. I have reviewed my findings and recommendations with the assigned Resident at the time of disposition. [GJ]   1809 EKG: This EKG is signed and interpreted by me.     Rate: 78  Rhythm: Sinus  Interpretation: no acute changes  Comparison: stable as compared to patient's most recent EKG      [DS]      ED Course User Index  [DS] Christa Boykin DO  [GJ] Simran Napier MD        ED Course as of Jan 23 2336 Thu Jan 23, 2020   1000 [facility] ATTENDING PROVIDER ATTESTATION:     Tonny Martinez presented to the emergency department for evaluation of right upper extremity/face swelling and was initially evaluated by the Resident. See Original ED Note for H&P and ED course above. I have reviewed and discussed the case, including pertinent history (medical, surgical, family and social) and exam findings with the Resident assigned to Trish Beatty. I have personally performed and/or participated in the history, exam, medical decision making, and procedures and agree with all pertinent clinical information. I have reviewed my findings and recommendations with the assigned Resident at the time of disposition. [GJ]   9154 EKG: This EKG is signed and interpreted by me. Rate: 78  Rhythm: Sinus  Interpretation: no acute changes  Comparison: stable as compared to patient's most recent EKG      [DS]      ED Course User Index  [DS] Bruno Fernandez DO  [GJ] Carmen Sosa MD       --------------------------------------------- PAST HISTORY ---------------------------------------------  Past Medical History:  has a past medical history of Arthritis, Fracture of left radius, History of cancer of right breast, Liver cancer ([facility] [address] Lung cancer ([facility] [address] Psoriasis, Skin cancer, Thyroid disease, and Vulva cancer ([facility] [address] Past Surgical History:  has a past surgical history that includes Tonsillectomy; eye surgery; Mastectomy (Right, 2003); Vulvectomy; Colonoscopy; Endoscopy, colon, diagnostic; Tubal ligation; and pr open rx distal radius fx, extra-articular (Left, 3/28/2018). Social History:  reports that she has quit smoking. Her smoking use included cigarettes. She has a 8.00 pack-year smoking history. She has never used smokeless tobacco. She reports current alcohol use. She reports that she does not use drugs. Family History: family history is not on file. The patients home medications have been reviewed. Allergies: Patient has no known allergies.     -------------------------------------------------- RESULTS

## 2020-03-19 PROBLEM — C22.1 CHOLANGIOCARCINOMA (HCC): Status: ACTIVE | Noted: 2020-01-01

## 2020-03-19 NOTE — PROGRESS NOTES
on file   Lifestyle    Physical activity     Days per week: Not on file     Minutes per session: Not on file    Stress: Not on file   Relationships    Social connections     Talks on phone: Not on file     Gets together: Not on file     Attends [employer] service: Not on file     Active member of club or organization: Not on file     Attends meetings of clubs or organizations: Not on file     Relationship status: Not on file    Intimate partner violence     Fear of current or ex partner: Not on file     Emotionally abused: Not on file     Physically abused: Not on file     Forced sexual activity: Not on file   Other Topics Concern    Not on file   Social History Narrative    Not on file           Occupation: retired  Retired:  YES: Patient is retired from [employer]. REVIEW OF SYSTEMS: <<For Level 5, 10 or more systems>> Approximately 20 minutes was spent with patient and her son, utilizing slides and handouts, related to receiving palliative radiation therapy to the mastoid process related to possible symptom relief. Biopsy of the right inner ear mass completed on 2/26/2020 revealed metastatic adenocarcinoma, with biopsy sampling being morphologically similar to the liver biopsy previously completed at [facility].. Patient has completed imaging, specifically CT of the temporal bones completed at [facility] on 2/18/2020, MRI of the Brain, completed on 2/8/2020 at [facility] as well as CT of the Abd/Pelvis/Chest, completed on 2/4/2020 at [facility]. Patient states she has a right-sided hearing deficit as well as pain to the inner ear. It is noted that patient also has experienced right facial paralysis and vision changes with issues beginning in December of last year.  Patient has a significant cancer history as noted by the following physicians: Dr Esqueda Fraction Hematology/Oncology at [facility], Dr Nelly Hagen, Radiation Oncology at [facility], Dr Simeon Giraldo, ENT at [facility], Dr Apple Fuller, Colorectal Surgeon at [facility], Dr Clayton Cline, [facility] at [facility], patient condition which places them at a risk for a fall   4-6 Moderate Risk 1. Provide assistance as indicated for ambulation activities  2. Reorient confused/cognitively impaired patient  3. Call-light/bell within patient's reach  4. Chair/bed in low position, stretcher/bed with siderails up except when performing patient care activities  5. Educate patient/family/caregiver on falls prevention  6. Falls risk precaution (Yellow sticker Level II) placed on patient chart   7 or   Higher High Risk 1. Place patient in easily observable treatment room  2. Patient attended at all times by family member or staff  3. Provide assistance as indicated for ambulation activities  4. Reorient confused/cognitively impaired patient  5. Call-light/bell within patient's reach  6. Chair/bed in low position, stretcher/bed with siderails up except when performing patient care activities  7. Educate patient/family/caregiver on falls prevention  8. Falls risk precaution (Yellow sticker Level III) placed on patient chart           MALNUTRITION RISK SCREENING ASSESSMENT    Instructions:  Assess the patient and enter the appropriate indicators that are present for nutrition risk identification. Total the numbers entered and assign a risk score. Follow the appropriate action for total score listed below. Assessment   Date  3/19/2020     1. Have you lost weight without trying? 0- No     2. Have you been eating poorly because of a decreased appetite? 0- No   3. Do you have a diagnosis of head and neck cancer?       1- Yes                                                                                    TOTAL 1          Score of 0-1: No action  Score 2 or greater:  · For Non-Diabetic Patient: Recommend adding Ensure Complete 2 x daily and provide patient with Ensure wellness bag with coupons  · For Diabetic Patient: Recommend adding Glucerna Shake 2 x daily and provide patient with Glucerna Wellness bag with coupons  · Route to the dietitian via [address]    · Are you having  difficulty performing daily routine tasks  due to fatigue or weakness (ie: bathing/showering, dressing, housework, meal prep, work, child Lee Yoder): Yes     · Do you have any arm flexibility/ROM restrictions, swelling or pain that limit activity: No     · Any changes in memory, attention/focus that impact daily activities: No     · Do you avoid participation in leisure/social activity due weakness, fatigue or pain: Yes     ARE ANY OF THE ABOVE ARE ANSWERED YES: Yes - but NO OT referral request sent due to patient refusal.          PT ASSESSMENT FOR REFERRAL    · Have you had any recent falls in past 2 months: No     · Do you have difficulty  going up/down stairs: Yes     · Are you having difficulty walking: No     · Do you often hold onto furniture/environmental supports or feel off balance when you are walking: Yes     · Do you need to take rest breaks when you are walking: Yes     · Any pain on scale of 1-10 that limits your mobility: No 0/10    ARE ANY OF THE ABOVE ARE ANSWERED YES: Yes - but NO PT referral request sent due to patient refusal.           LYMPHEDEMA SCREENING ASSESSMENT FOR PATIENTS WITH BREAST CANCER    The patient reports the following signs/symptoms of lymphedema: None    Please ask the provider to assess patient for lymphedema for any reported signs or symptoms so a referral to Lymphedema Therapy can be considered. PREHAB AUDIOLOGY REFERRAL    - Is patient planned to receive Cisplatin? -patient initiated chemotherapy, Cisplatin specifically, from [facility]. initiated administration from 7/11/2019    - Is patient planned to receive radiation therapy that may be directed toward auditory canals or nerves? Unknown. Will check with Dr Silvana Browning MD and request audiology consult as indicated. - Is patient complaining of new onset hearing loss?  Yes, but NO audiology referral request

## 2020-03-19 NOTE — PATIENT INSTRUCTIONS
veto Palacio. MD [facility]  [facility]  Cell: [phone]    [facility]:  [phone]   FAX: [phone] [address] [facility]:  [address] [place] [address] Avenue:    [phone]  [facility]:  [phone]   FAX:  [phone]  Email: [email or web address]

## 2020-03-19 NOTE — PROGRESS NOTES
Radiation Oncology      Demian Andres. Carissa Venegas  [address] [place] Serrato   [phone]               Referring Physician: Dr. Laurie Stone MD   Primary Oncologist: Dr. 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, with FDG-avid metastatic pulmonary nodules, being treated with chemotherapy at OSH, now with right mastoid metastasis s/p biopsy 2/26/20, c/b right House-Brackman 6 facial paralysis  2) Breast cancer s/p mastectomy with bilateral reconstruction 2003, no adjuvant treatment  3) Vulvar cancer s/p resection 2005, with local excision of vulvar lesion 4 x 3 cm 10/14/14, recurrent high-grade VIN3 lesion s/p resection 5/27/15, s/p simple anterior and left vulvectomy 7/8/15  43) 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 PMH breast cancer s/p mastectomy, localized vulvar cancer s/p multiple surgical resections, perianal SCC s/p excision, intrahepatic cholangiocarcinoma with suspected lung mets not yet on systemic therapy, presents with right facial paralysis and a new right mastoid mass. Her current symptoms began in 12/2019 with paralysis of the right side of the face. The patient underwent MRI Brain on 2/7/20, which demonstrated a well-circumscribed, enhancing mass within the right inferior mastoid air cells, protruding into the posterior aspect of the EAC. She was evaluated by ENT Dr. Sofy Ramirez who described a House-Brackman Grade 6 right facial paralysis.  She was taken to the OR on 2/26/20 by Dr. Howie Dickson for biopsy of the mass, and right lower eyelid ectropion repair. Biopsy demonstrated metastatic adenocarcinoma, morphologically similar to her prior liver biopsy. Sydnie Stiles presents in consultation accompanied by her son. Her symptoms began abruptly on 12/1/19. It was initially thought to be bell's palsy, but on initially ENT evaluation was thought to represent paralysis from tumor. Her paralysis has been unchanged since the time of onset. She has throbbing pain in the inside of the right ear and in the right post-auricular space. She is using oxycodone for pain control. She has some intermittent bloody discharge from the right external ear canal. She has subjective hearing loss on the right. She has noticed a change in taste over the past week, with some associated decrease in taste. She is getting her systemic therapy at [facility] in [place], [place]. She is unsure what therapy she is on (gemcitabine/oxaliplatin was initially recommended by Dr. Britt Crow at [facility]). Per the patient, she progressed following several cycles of chemotherapy and has recently missed several administrations.        -----        The patient presents today to discuss fractionated external beam radiation therapy as a component of multidisciplinary, palliative management. We reviewed the available medical records including the complete medical history of this pt today prior to consultation. Epic -CE and available scanned documents per the Epic Media tab were reviewed PRN. A complete ROS was also performed today and is noted below. During consultation today I personally discussed the pts workup to date; including but not limited to applicable imaging studies, Pathology reports, and interventions. The NCCN guidelines, as pertaining to the above diagnosis were also recapped for the pt today in brief. Today, Sydnie Stiles  notes Sx that include R face neurological deficit. KPS 70.             Past Medical History:   Diagnosis Date  Marital status:      Spouse name: None    Number of children: None    Years of education: None    Highest education level: None   Occupational History    None   Social Needs    Financial resource strain: None    Food insecurity     Worry: None     Inability: None    Transportation needs     Medical: None     Non-medical: None   Tobacco Use    Smoking status: Former Smoker     Packs/day: 0.25     Years: 32.00     Pack years: 8.00     Types: Cigarettes    Smokeless tobacco: Never Used    Tobacco comment: quit 20 yrs ago   Substance and Sexual Activity    Alcohol use: Yes     Comment: rarely    Drug use: No    Sexual activity: None   Lifestyle    Physical activity     Days per week: None     Minutes per session: None    Stress: None   Relationships    Social connections     Talks on phone: None     Gets together: None     Attends Shinto service: None     Active member of club or organization: None     Attends meetings of clubs or organizations: None     Relationship status: None    Intimate partner violence     Fear of current or ex partner: None     Emotionally abused: None     Physically abused: None     Forced sexual activity: None   Other Topics Concern    None   Social History Narrative    Lives with son Shiela Gomes. Retired from [employer].          Review of Systems - History obtained from chart review and the patient  General ROS: positive for  - weight loss  Psychological ROS: negative  Ophthalmic ROS: negative  ENT ROS: R sided hearing loss  Allergy and Immunology ROS: negative  Hematological and Lymphatic ROS: negative  Endocrine ROS: negative  Respiratory ROS: positive for - shortness of breath  Cardiovascular ROS: no chest pain or dyspnea on exertion  Gastrointestinal ROS: no abdominal pain, change in bowel habits, or black or bloody stools  Genito-Urinary ROS: no dysuria, trouble voiding, or hematuria  Musculoskeletal ROS: weakness  Neurological ROS: positive for - dizziness and speech problems  Dermatological ROS: negative        Physical Exam  HENT:      Head: Normocephalic and atraumatic. Right Ear: External ear normal.      Left Ear: External ear normal.      Nose: Nose normal.      Mouth/Throat:      Mouth: Mucous membranes are moist.   Eyes:      Pupils: Pupils are equal, round, and reactive to light. Neck:      Musculoskeletal: Normal range of motion. Cardiovascular:      Rate and Rhythm: Normal rate. Pulses: Normal pulses. Heart sounds: Normal heart sounds. Pulmonary:      Effort: Pulmonary effort is normal.      Breath sounds: Normal breath sounds. Abdominal:      General: Abdomen is flat. Musculoskeletal: Normal range of motion. Skin:     General: Skin is warm. Neurological:      General: No focal deficit present. Mental Status: She is alert and oriented to person, place, and time. Cranial Nerves: Cranial nerve deficit present. Motor: Weakness present. Psychiatric:         Mood and Affect: Mood normal.         Behavior: Behavior normal.         Thought Content: Thought content normal.         Judgment: Judgment normal.           Imaging reviewed:        CT head 12/4/19:  Impression   Small short linear calcification seen adjacent to the   left lateral wall of the superior sagittal sinus at the vertex.  This   appears to be chronic       Small calcifications of the carotid siphons.       CT head otherwise unremarkable           Radiation Safety and Treatment Support:  -previous Radiation history: no  -history of connective tissue disease: No  -history of autoimmune disease: No  -pregnant: no  -fertility conservation and /or contraception discussed: no  -nutrition consult prior to [address]: Yes  -PEG: No  -Dental evaluation prior to treatment:No  -Social Work requested: Yes  -Oncology Nurse Navigator requested: Yes  -pre + post treatment PT / Rehab / PM+R evaluation considered: Yes  -ICD: No   -ICD brand: -  -[facility] patient navigator: Ed Mccallum inadvertent computerized transcription errors may be present.

## 2020-03-23 NOTE — PROGRESS NOTES
with Dr. Raciel Griffiths. She developed sudden facial paralysis in December of 2019, and was found to have right mastoid metastatic tumor, s/p biopsy on 2/26/2020, consistent with metastatic adenocarcinoma, morphologically similar to her prior liver biopsy. She is now receiving palliative radiation therapy to her mastoid tumor. She was referred to [address] [place] [address] for symptom management. As Per Dr. 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, with FDG-avid metastatic pulmonary nodules, being treated with chemotherapy at [facility], now with right mastoid metastasis s/p biopsy 2/26/20, c/b right House-Brackman 6 facial paralysis  2) Breast cancer s/p mastectomy with bilateral reconstruction 2003, no adjuvant treatment  3) Vulvar cancer s/p resection 2005, with local excision of vulvar lesion 4 x 3 cm 10/14/14, recurrent high-grade VIN3 lesion s/p resection 5/27/15, s/p simple anterior and left vulvectomy 7/8/15  43) 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 PMH breast cancer s/p mastectomy, localized vulvar cancer s/p multiple surgical resections, perianal SCC s/p excision, intrahepatic cholangiocarcinoma with suspected lung mets not yet on systemic therapy, presents with right facial paralysis and a new right mastoid mass. Her current symptoms began in 12/2019 with paralysis of the right side of the face. The patient underwent MRI Brain on 2/7/20, which demonstrated a well-circumscribed, enhancing mass within the right inferior mastoid air cells, protruding into the posterior aspect of the EAC. She was evaluated by ENT Dr. Idalia Castrejon who described a House-Brackman Grade 6 right facial paralysis.  She was taken to the OR on 2/26/20 by Dr. Krissy Barnes for biopsy of the mass, and right lower eyelid ectropion repair. Biopsy demonstrated metastatic adenocarcinoma, morphologically similar to her prior liver biopsy. Willy Rosas presents in consultation accompanied by her son. Her symptoms began abruptly on 12/1/19. It was initially thought to be bell's palsy, but on initially ENT evaluation was thought to represent paralysis from tumor. Her paralysis has been unchanged since the time of onset. She has throbbing pain in the inside of the right ear and in the right post-auricular space. She is using oxycodone for pain control. She has some intermittent bloody discharge from the right external ear canal. She has subjective hearing loss on the right. She has noticed a change in taste over the past week, with some associated decrease in taste. She is getting her systemic therapy at [facility] in [place], [place]. She is unsure what therapy she is on (gemcitabine/oxaliplatin was initially recommended by Dr. Latha Espinosa at [facility]). Per the patient, she progressed following several cycles of chemotherapy and has recently missed several administrations. The patient presents today to discuss fractionated external beam radiation therapy as a component of multidisciplinary, palliative management. We reviewed the available medical records including the complete medical history of this pt today prior to consultation. Epic -CE and available scanned documents per the Epic Media tab were reviewed PRN. A complete ROS was also performed today and is noted below. During consultation today I personally discussed the pts workup to date; including but not limited to applicable imaging studies, Pathology reports, and interventions. The NCCN guidelines, as pertaining to the above diagnosis were also recapped for the pt today in brief. Today, Willy Rosas  notes Sx that include R face neurological deficit. KPS 70.     As Per Dr. Stanislav Segura on History:   Procedure Laterality Date    COLONOSCOPY      ENDOSCOPY, COLON, DIAGNOSTIC      EYE SURGERY      bilateral detached retina    MASTECTOMY Right 2003    CO OPEN RX DISTAL RADIUS FX, EXTRA-ARTICULAR Left 3/28/2018    LEFT  DISTAL RADIUS  OPEN REDUCTION INTERNAL FIXATION performed by Donna Yeboah MD at [place] [address]. VULVECTOMY         Family History   Problem Relation Age of Onset    Breast Cancer Other         breast cancer in a daughter at age 27       ROS: UNLESS STATED ABOVE PATIENT DENIES:  CONSTITUTIONAL:  fever, chill, rigors, nausea, vomiting, fatigue. HEENT: blurry vision, double vision, hearing problem, tinnitus, hoarseness, dysphagia, odynophagia  RESPIRATORY: cough, shortness of breath, sputum expectoration. CARDIOVASCULAR:  Chest pain/pressure, palpitation, syncope, irregular beats  GASTROINTESTINAL:  abdominal or rectal pain, diarrhea, constipation, . GENITOURINARY:  Burning, frequency, urgency, incontinence, discharge  INTEGUMENTARY: rash, wound, pruritis  HEMATOLOGIC/LYMPHATIC:  Swelling, sores, gum bleeding, easy bruising, pica.   MUSCULOSKELETAL:  pain, edema, joint swelling or redness  NEUROLOGICAL:  light headed, dizziness, loss of consciousness, weakness, change in memory, seizures, tremors    Objective:     Physical Exam  BP (!) 140/86   Pulse 80   Wt 113 lb (51.3 kg)   SpO2 97% Comment: RA  BMI 20.02 kg/m²     Gen:  Alert, appears stated age, well nourished, in no acute distress  HEENT:  Normocephalic, conjunctiva pink, no drainage, mucosa moist  Neck:  Supple  Lungs:  CTA bilaterally, no audible rhonchi or wheezes noted  Heart[de-identified]  RRR, no murmur, rub, or gallop noted during exam  Abd:  Soft, non tender, non distended, BS+  M/S/Ext:  Moving all extremities, no edema, pulses present  Skin:  Warm and dry  Neuro:  PERRL, Alert, oriented x 3; following commands    Ovid Symptom Assessment Score   Ovid Score 3/23/2020   Pain Score

## 2020-03-23 NOTE — TELEPHONE ENCOUNTER
RN placed called to palliative medicine to request urgent referral. Patient will be immediately following appointment at [facility].

## 2020-03-25 NOTE — PROGRESS NOTES
DEPARTMENT OF RADIATION ONCOLOGY ON TREATMENT VISIT         3/25/2020      NAME:  Victoria Owens    YOB: 1945    Diagnosis: R mandible    SUBJECTIVE:   Victoria Owens has now received 1200 cGy in 3/5 fractions directed to the R mandible. Past medical, surgical, social and family histories reviewed and updated as indicated. Pain: controlled    ALLERGIES:  Patient has no known allergies. Current Outpatient Medications   Medication Sig Dispense Refill    HYDROcodone-acetaminophen (NORCO)  MG per tablet Take 1 tablet by mouth every 4 hours as needed for Pain for up to 7 days. 42 tablet 0    gabapentin (NEURONTIN) 250 MG/5ML solution Take 2.5 mLs by mouth nightly for 5 days, THEN 5 mLs nightly for 5 days, THEN 10 mLs nightly for 20 days. 473 mL 3    scopolamine (TRANSDERM-SCOP) transdermal patch Place 1 patch onto the skin every 72 hours 10 patch 0    apixaban (ELIQUIS) 5 MG TABS tablet Take by mouth 2 times daily      prochlorperazine (COMPAZINE) 5 MG tablet Take 5 mg by mouth every 6 hours as needed for Nausea      alendronate (FOSAMAX) 70 MG tablet Take 70 mg by mouth every 7 days      senna (SENOKOT) 8.6 MG tablet Take 1 tablet by mouth daily as needed for Constipation      docusate sodium (COLACE) 100 MG capsule Take 100 mg by mouth 2 times daily as needed for Constipation      ondansetron (ZOFRAN) 8 MG tablet Take 8 mg by mouth every 8 hours as needed for Nausea or Vomiting      acetaminophen (TYLENOL) 325 MG tablet Take 650 mg by mouth every 6 hours as needed for Pain      levothyroxine (SYNTHROID) 75 MCG tablet take 1 tablet by mouth once daily  0     No current facility-administered medications for this encounter. OBJECTIVE:  Alert and fully ambulatory. Pleasant and conversant. Physical Examination: General appearance - alert, well appearing, and in no distress.           Wt Readings from Last 3 Encounters:   03/25/20 114 lb 6.4 oz (51.9 kg)   03/23/20 113 lb (51.3 kg)   03/19/20 117 lb 14.4 oz (53.5 kg)         ASSESSMENT/PLAN:     Patient is tolerating treatments well with expected toxicities. RBA were reviewed prior to first fraction and PRN. Current and planned dose reviewed. Goals of treatment and potential side effects were reviewed with the patient PRN. Treatment imaging has been personally reviewed for accuracy and precision. Questions answered to apparent satisfaction. Treatments will continue as planned. Jovanny Cain.  Glynis Severin, MD MS KAYDENR  Radiation Oncologist        [facility] ([address] [place] [address]): [phone] /// FAX: [phone]  [facility]): [phone] /// FAX: [phone]  [address] [place] [address] [place]): [phone] /// FAX: [phone]

## 2020-03-27 NOTE — PROGRESS NOTES
Amarilysestefania Hopper  3/27/2020  8:28 AM          Current Outpatient Medications   Medication Sig Dispense Refill    HYDROcodone-acetaminophen (NORCO)  MG per tablet Take 1 tablet by mouth every 4 hours as needed for Pain for up to 7 days. 42 tablet 0    gabapentin (NEURONTIN) 250 MG/5ML solution Take 2.5 mLs by mouth nightly for 5 days, THEN 5 mLs nightly for 5 days, THEN 10 mLs nightly for 20 days. 473 mL 3    scopolamine (TRANSDERM-SCOP) transdermal patch Place 1 patch onto the skin every 72 hours 10 patch 0    apixaban (ELIQUIS) 5 MG TABS tablet Take by mouth 2 times daily      prochlorperazine (COMPAZINE) 5 MG tablet Take 5 mg by mouth every 6 hours as needed for Nausea      alendronate (FOSAMAX) 70 MG tablet Take 70 mg by mouth every 7 days      senna (SENOKOT) 8.6 MG tablet Take 1 tablet by mouth daily as needed for Constipation      docusate sodium (COLACE) 100 MG capsule Take 100 mg by mouth 2 times daily as needed for Constipation      ondansetron (ZOFRAN) 8 MG tablet Take 8 mg by mouth every 8 hours as needed for Nausea or Vomiting      acetaminophen (TYLENOL) 325 MG tablet Take 650 mg by mouth every 6 hours as needed for Pain      levothyroxine (SYNTHROID) 75 MCG tablet take 1 tablet by mouth once daily  0     No current facility-administered medications for this encounter. This is an up-to-date medication list.    Please take this list to your next care provider, and discard any previous medication lists.

## 2020-04-06 NOTE — TELEPHONE ENCOUNTER
Attempted to call patient to reschedule PC appt for 4/7/20. Left message, visit needs rescheduled to video or phone visit due to no on-site visits due to pandemic.

## 2020-04-07 NOTE — PROGRESS NOTES
Palliative Care Department  Palliative Care Telephone Encounter  Provider: Kat ORTIZ    Casey Teran is a 69 y.o. female evaluated via telephone on 4/7/2020. Consent:  She and/or health care decision maker is aware that that she may receive a bill for this telephone service, depending on her insurance coverage, and has provided verbal consent to proceed: Yes      Documentation:  I communicated with the patient and/or health care decision maker regarding pain. Assessment/Plan    Stage IV intrahepatic cholangiocarcinoma:              -  Dx in 5/2019, work up completed at [facility]              -  Following locally with Dr. 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              -  Completed Palliative Radiation therapy to her right mastoid tumor today   -  S/P GRAFT FOR FACIAL NERVE PARALYSIS FREE FASCIA GRAFT at [facility] on 4/2/2020              -  To follow up at the [facility] this week to discussed further treatment plan     Pain due to neoplasm:              -  Primarily located in her right ear and face              -  Hydrocodone 10/325 mg Q 6 PRN, using 3-4 per day           -  Gabapentin Liquid up-titrating in a stepwise fashion to an initial goal of 500 mg HS    Details of this discussion including any medical advice provided:   A telephonic virtual visit was completed via telephone with Casey Teran today regarding the issues listed above. She is alert and oriented, and able to voice her needs and concerns well, and there were no signs of sedation confusion. She states that overall her pain has improved, especially following her surgery last week.   She states that she is continued to utilize hydrocodone, typically using 3 to 4 tablets/day, as well as gabapentin 500 mg at nighttime, and she states she feels that this regimen is working very well to manage Medicine    Note: not billable if this call serves to triage the patient into an appointment for the relevant concern    Note: This report was completed using computerize voice recognition software. Every effort has been made to ensure accuracy; however, inadvertent computerized transcription errors may be present.

## 2020-05-08 NOTE — ED PROVIDER NOTES
General: She is not in acute distress. Appearance: Normal appearance. She is well-developed and normal weight. She is not ill-appearing. HENT:      Head: Normocephalic and atraumatic. Mouth/Throat:      Mouth: Mucous membranes are moist.   Eyes:      Pupils: Pupils are equal, round, and reactive to light. Neck:      Musculoskeletal: Normal range of motion and neck supple. Cardiovascular:      Rate and Rhythm: Normal rate and regular rhythm. Pulses: Normal pulses. Heart sounds: Normal heart sounds. No murmur. Pulmonary:      Effort: Pulmonary effort is normal. No respiratory distress. Breath sounds: Normal breath sounds. No wheezing or rales. Abdominal:      General: Bowel sounds are normal. There is distension. Palpations: Abdomen is soft. Tenderness: There is no abdominal tenderness. There is no guarding or rebound. Musculoskeletal:         General: No tenderness or signs of injury. Right lower leg: Edema present. Left lower leg: Edema present. Comments: Mild bilateral lower extremity edema  No calf tenderness to palpation     Skin:     General: Skin is warm and dry. Capillary Refill: Capillary refill takes less than 2 seconds. Findings: No lesion or rash. Neurological:      General: No focal deficit present. Mental Status: She is alert and oriented to person, place, and time. Cranial Nerves: No cranial nerve deficit.       Coordination: Coordination normal.   Psychiatric:         Mood and Affect: Mood normal.         Behavior: Behavior normal.          Procedures     Labs Reviewed   CBC WITH AUTO DIFFERENTIAL - Abnormal; Notable for the following components:       Result Value    RDW 18.6 (*)     MPV 12.2 (*)     Monocytes % 1.3 (*)     Eosinophils Absolute 0.01 (*)     All other components within normal limits   BASIC METABOLIC PANEL W/ REFLEX TO MG FOR LOW K - Abnormal; Notable for the following components:    Potassium reflex Magnesium 3.2 (*)     Glucose 181 (*)     BUN 30 (*)     Calcium 8.4 (*)     All other components within normal limits   HEPATIC FUNCTION PANEL - Abnormal; Notable for the following components: Total Protein 5.8 (*)     Alb 3.1 (*)     Alkaline Phosphatase 173 (*)     ALT 75 (*)     AST 58 (*)     All other components within normal limits   BRAIN NATRIURETIC PEPTIDE - Abnormal; Notable for the following components:    Pro- (*)     All other components within normal limits   APTT - Abnormal; Notable for the following components:    aPTT 23.7 (*)     All other components within normal limits   PROTIME-INR   TROPONIN   MAGNESIUM     CT ABDOMEN PELVIS W IV CONTRAST Additional Contrast? None   Final Result   Significant interval progression of disease manifested by interval   development of extensive pulmonary metastatic disease, progressive hepatic   metastatic disease and new mediastinal lymphadenopathy. No acute findings within the chest, abdomen or pelvis. CT CHEST W CONTRAST   Final Result   Significant interval progression of disease manifested by interval   development of extensive pulmonary metastatic disease, progressive hepatic   metastatic disease and new mediastinal lymphadenopathy. No acute findings within the chest, abdomen or pelvis. EKG #1:  Interpreted by emergency department physician unless otherwise noted. Time:  1505  Rate: 91  Rhythm: Sinus. Interpretation: sinus with occasional PVC, normal axis, QRS 72, QTc 467, no ST or T wave changes, low voltage compared to previous tracings on 12/4/19. MDM  Number of Diagnoses or Management Options  Cancer [facility]): Hypokalemia:   Localized swelling of both lower legs:   Diagnosis management comments: Carole Saldaña is a 69year old female with a history of cancer who presented for lower extremity swelling and abdominal distention. Physical exam shows mild bilateral LE edema, and abdominal distention without tenderness. EKG 12 Lead   Result Value Ref Range    Ventricular Rate 91 BPM    Atrial Rate 91 BPM    P-R Interval 128 ms    QRS Duration 72 ms    Q-T Interval 380 ms    QTc Calculation (Bazett) 467 ms    P Axis 36 degrees    R Axis 12 degrees    T Axis 31 degrees       Radiology:  CT ABDOMEN PELVIS W IV CONTRAST Additional Contrast? None   Final Result   Significant interval progression of disease manifested by interval   development of extensive pulmonary metastatic disease, progressive hepatic   metastatic disease and new mediastinal lymphadenopathy. No acute findings within the chest, abdomen or pelvis. CT CHEST W CONTRAST   Final Result   Significant interval progression of disease manifested by interval   development of extensive pulmonary metastatic disease, progressive hepatic   metastatic disease and new mediastinal lymphadenopathy. No acute findings within the chest, abdomen or pelvis.             ------------------------- NURSING NOTES AND VITALS REVIEWED ---------------------------  Date / Time Roomed:  5/8/2020  2:30 PM  ED Bed Assignment:  07/07    The nursing notes within the ED encounter and vital signs as below have been reviewed. BP (!) 157/79   Pulse 89   Temp 98 °F (36.7 °C) (Oral)   Resp 20   Ht 5' 3\" (1.6 m)   Wt 131 lb 4.8 oz (59.6 kg)   SpO2 97%   BMI 23.26 kg/m²   Oxygen Saturation Interpretation: Normal      ------------------------------------------ PROGRESS NOTES ------------------------------------------  I have spoken with the patient and discussed todays results, in addition to providing specific details for the plan of care and counseling regarding the diagnosis and prognosis. Their questions are answered at this time and they are agreeable with the plan. I discussed at length with them reasons for immediate return here for re evaluation. They will followup with primary care by calling their office tomorrow.       --------------------------------- ADDITIONAL PROVIDER

## 2020-05-11 NOTE — PROGRESS NOTES
her ear and neck, with this pain being resolved at this time. She reports increased myalgias and joint pain, especially in her knees, which began after getting injections to increase her blood counts. She states that she is continued to utilize hydrocodone, typically using 3 to 4 tablets/day, as well as gabapentin 500 mg at nighttime, and she states she feels that this regimen is working very well to manage her pain. She additionally complains today of significant fatigue and decreased appetite with weight loss. She reports that she eats some during the day, but often does not finish meals. She is not using any dietary supplements at this time. She is encouraged to use of dietary supplementation, and we will start trial of Megace for her appetite. Otherwise she denies any other significant trouble, we will not make any further changes to her plan of care. We will provide a refill of her hydrocodone, and she will continue gabapentin as previous prescribed. We will plan to follow-up with her in approximately 4 weeks to reassess her needs, she is encouraged to call if she has any questions, concerns, or changes in her symptoms prior to next encounter.     Pain Assessment   Ratin  Description: aching, throbbing and pounding  Duration: weeks  Frequency: daily  Location:  knees  Alleviating Factors: nothing  Exacerbating Factors: unable to associate with any factor  Effect: change in function, interference with activities, sleep and mood    Hamilton Symptom Assessment Score   Hamilton Score 2020 2020 3/23/2020   Pain Score 5 7 5   Tiredness Score 9 5 8   Nausea Score Not nauseated Not nauseated 8   Depression Score Not depressed Not depressed Worst possible depression   Anxiety Score Not anxious Not anxious Worst possible anxiety   Drowsiness Score 3 Not drowsy 1   Appetite Score 7 Best appetite 5   Wellbeing Score 3 Best feeling of wellbeing 5   Dyspnea Score No shortness of breath No shortness of breath No shortness of breath   Other Problem Score Best possible response Best possible response 1   Total Assessment Score(calculated) 27 12 53     Controlled Substance Monitoring:  [place] reviewed 5/11/20  RX Monitoring 5/11/2020   Periodic Controlled Substance Monitoring Possible medication side effects, risk of tolerance/dependence & alternative treatments discussed. ;No signs of potential drug abuse or diversion identified. ;Assessed functional status. ;Obtaining appropriate analgesic effect of treatment. Chronic Pain > 50 MEDD Re-evaluated the status of the patient's underlying condition causing pain. Chronic Pain > 80 MEDD Obtained or confirmed \"Medication Contract\" on file. I affirm this episode is with an Established Patient who has not had a related appointment within my department in the past 7 days or scheduled within the next 24 hours. Total Time: minutes: 11-20 minutes  Time Started:  1315  Time Ended:  Annetta Gamez [place] [id number] Palliative Medicine    Note: not billable if this call serves to triage the patient into an appointment for the relevant concern    Note: This report was completed using computerize voice recognition software. Every effort has been made to ensure accuracy; however, inadvertent computerized transcription errors may be present.

## 2020-05-26 NOTE — PROGRESS NOTES
Palliative Care Department  Palliative Care Telephone Encounter  Provider: Pearla Dakins APRN-SOM    Perfecto Kolb is a 69 y.o. female evaluated via telephone on 5/26/2020. Consent:  She and/or health care decision maker is aware that that she may receive a bill for this telephone service, depending on her insurance coverage, and has provided verbal consent to proceed: Yes      Documentation:  I communicated with the patient and/or health care decision maker regarding pain. Assessment/Plan    Stage IV intrahepatic cholangiocarcinoma:              -  Dx in 5/2019, work up completed at [facility] [place]              -  Following locally with Dr. 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              -  Completed Palliative Radiation therapy to her right mastoid tumor today   -  S/P GRAFT FOR FACIAL NERVE PARALYSIS FREE FASCIA GRAFT at [facility] on 4/2/2020   -  FOLFOX   -  CT's on 5/8/2020 show progression of disease     Pain due to neoplasm:              -  Hydrocodone 10/325 mg Q 6 PRN, using 3-4 per day           -  Gabapentin Liquid up-titrating in a stepwise fashion to an initial goal of 500 mg HS    Weakness and Fatigue:   -  Encouarged to increase activity as tolerate   -  May consider Methylphenidate if continues/worsens    Decreased Appetite/Unintentional Weight loss/Protein-Calorie Malnutrition:   - Encourage to use Boost/Ensure/ICB   - Megace 400 mg daily   - Eating better now    Constipation:   -  Using Colace 2 tabs HS   -  Add Miralax 17 g Daily    Details of this discussion including any medical advice provided:   A telephonic virtual visit was completed via telephone with Perfecto Kolb today regarding the issues listed above. She is alert and oriented, and able to voice her needs and concerns well, and there were no signs of sedation confusion.   She states that overall her

## 2020-05-26 NOTE — ED PROVIDER NOTES
Psychiatric/Behavioral: Negative for agitation. All other systems reviewed and are negative. Physical Exam  Vitals signs and nursing note reviewed. Constitutional:       General: She is not in acute distress. Appearance: Normal appearance. She is well-developed. She is not ill-appearing or diaphoretic. Comments: Looks older than stated age. HENT:      Head: Normocephalic and atraumatic. Jaw: Tenderness present. No trismus, swelling, pain on movement or malocclusion. Comments: No glottic swelling, no lip swelling, no poor dentition and mouth. No sores visible. Nose: Nose normal. No congestion or rhinorrhea. Mouth/Throat:      Mouth: Mucous membranes are moist.      Pharynx: Oropharynx is clear. No oropharyngeal exudate or posterior oropharyngeal erythema. Eyes:      General: No scleral icterus. Right eye: No discharge. Left eye: No discharge. Extraocular Movements: Extraocular movements intact. Conjunctiva/sclera: Conjunctivae normal.      Pupils: Pupils are equal, round, and reactive to light. Neck:      Musculoskeletal: Normal range of motion and neck supple. No neck rigidity or muscular tenderness. Comments: No cervical lymphadenopathy. No neck fullness on palpation. Cardiovascular:      Rate and Rhythm: Regular rhythm. Tachycardia present. Pulses: Normal pulses. No decreased pulses. Dorsalis pedis pulses are 2+ on the right side and 2+ on the left side. Heart sounds: Normal heart sounds. Heart sounds not distant. No murmur. No systolic murmur. No diastolic murmur. No friction rub. No gallop. Pulmonary:      Effort: No tachypnea, accessory muscle usage, prolonged expiration, respiratory distress or retractions. Breath sounds: Normal breath sounds. No decreased breath sounds, wheezing, rhonchi or rales. Abdominal:      General: Bowel sounds are normal.      Palpations: Abdomen is soft.       Tenderness: 5.0 mmol/L    Chloride 96 (L) 98 - 107 mmol/L    CO2 26 22 - 29 mmol/L    Anion Gap 14 7 - 16 mmol/L    Glucose 188 (H) 74 - 99 mg/dL    BUN 13 8 - 23 mg/dL    CREATININE 0.7 0.5 - 1.0 mg/dL    GFR Non-African American >60 >=60 mL/min/1.73    GFR African American >60     Calcium 8.1 (L) 8.6 - 10.2 mg/dL   CBC Auto Differential   Result Value Ref Range    WBC 20.7 (H) 4.5 - 11.5 E9/L    RBC 3.70 3.50 - 5.50 E12/L    Hemoglobin 11.2 (L) 11.5 - 15.5 g/dL    Hematocrit 34.1 34.0 - 48.0 %    MCV 92.2 80.0 - 99.9 fL    MCH 30.3 26.0 - 35.0 pg    MCHC 32.8 32.0 - 34.5 %    RDW 23.9 (H) 11.5 - 15.0 fL    Platelets 225 182 - 516 E9/L    MPV 12.8 (H) 7.0 - 12.0 fL    Neutrophils % 92.2 (H) 43.0 - 80.0 %    Lymphocytes % 6.1 (L) 20.0 - 42.0 %    Monocytes % 0.9 (L) 2.0 - 12.0 %    Eosinophils % 0.0 0.0 - 6.0 %    Basophils % 0.9 0.0 - 2.0 %    Neutrophils Absolute 19.04 (H) 1.80 - 7.30 E9/L    Lymphocytes Absolute 1.24 (L) 1.50 - 4.00 E9/L    Monocytes Absolute 0.21 0.10 - 0.95 E9/L    Eosinophils Absolute 0.00 (L) 0.05 - 0.50 E9/L    Basophils Absolute 0.19 0.00 - 0.20 E9/L    Anisocytosis 3+     Polychromasia 1+     Poikilocytes 1+     Miguel Cells 1+     Ovalocytes 1+     Target Cells 1+     Tear Drop Cells 1+    Magnesium   Result Value Ref Range    Magnesium 1.2 (L) 1.6 - 2.6 mg/dL   EKG 12 Lead   Result Value Ref Range    Ventricular Rate 81 BPM    Atrial Rate 81 BPM    P-R Interval 146 ms    QRS Duration 78 ms    Q-T Interval 396 ms    QTc Calculation (Bazett) 460 ms    P Axis 36 degrees    R Axis 8 degrees    T Axis 29 degrees       Radiology:  CT SOFT TISSUE NECK W CONTRAST   Final Result   No acute abnormality of the soft tissue structures of the neck to explain   patient's stridor. Large destructive soft tissue mass seen centered on the right mastoid air   cells, effacing or possibly invading the ipsilateral sigmoid sinus. This   presumably reflects a metastasis given the history of cancer.       Lung nodules,

## 2020-06-09 NOTE — PROGRESS NOTES
Palliative Care Department  Palliative Care Telephone Encounter  Provider: Dinorah ORTIZ    Jacques Murrell is a 69 y.o. female evaluated via telephone on 6/9/2020. Consent:  She and/or health care decision maker is aware that that she may receive a bill for this telephone service, depending on her insurance coverage, and has provided verbal consent to proceed: Yes      Documentation:  I communicated with the patient and/or health care decision maker regarding pain. Assessment/Plan    Stage IV intrahepatic cholangiocarcinoma:              -  Dx in 5/2019, work up completed at [facility] [place]              -  Following locally with Dr. 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 Valeria Griggs     Pain due to neoplasm:              -  Hydrocodone 10/325 mg Q 6 PRN, using 2-4 per day           -  Gabapentin Liquid up-titrating in a stepwise fashion to an initial goal of 500 mg HS    Weakness and Fatigue:   -  Encouarged to increase activity as tolerate   -  May consider Methylphenidate if continues/worsens    Decreased Appetite/Unintentional Weight loss/Protein-Calorie Malnutrition:   - Encourage to use Boost/Ensure/ICB   - Megace 400 mg daily   - Eating better now    Constipation:   -  Using Colace 2 tabs HS   -  Add Miralax 17 g Daily    Details of this discussion including any medical advice provided:   A telephonic virtual visit was completed via telephone with Jacques Murrell today regarding the issues listed above.   She is alert and oriented, and able to voice her needs and concerns well, and there were no signs of sedation confusion. She states that overall she is doing fair, with some pain in her right ear, she states she believes she may have a new tumor in her right ear again. She is to follow-up with Dr. Kenna Rosario regarding this. She continues to follow closely with the [facility] as well. She is continued utilize her hydrocodone  mg using this about 2-4 times per day, she states that she does not need a refill of this at this time. She also continues with gabapentin 500 mg at nighttime unchanged. Overall she states that she has not had any increase in her fatigue and weakness, and her appetite continues to be stable. She does continue have some mild constipation, but is utilizing MiraLAX every other day, and reports that she is moving her bowels well. She otherwise has no complaints, and states that she has no needs today. We will plan to follow-up with her in approximately 4 weeks to reassess her needs, and as always she was encouraged to call with any questions, concerns, or changes in her symptoms prior to her next encounter.     Pain Assessment   Ratin  Description: aching, throbbing and pounding  Duration: weeks  Frequency: daily  Location:  knees  Alleviating Factors: nothing  Exacerbating Factors: unable to associate with any factor  Effect: change in function, interference with activities, sleep and mood    Tilden Symptom Assessment Score   Tilden Score 2020 2020 2020 2020 3/23/2020   Pain Score 5 5 5 7 5   Tiredness Score 6 7 9 5 8   Nausea Score Not nauseated Not nauseated Not nauseated Not nauseated 8   Depression Score Not depressed Not depressed Not depressed Not depressed Worst possible depression   Anxiety Score Not anxious Not anxious Not anxious Not anxious Worst possible anxiety   Drowsiness Score 1 2 3 Not drowsy 1   Appetite Score 2 4 7 Best appetite 5   Wellbeing Score 3 3 3 Best feeling of wellbeing 5   Dyspnea Score No shortness of

## 2020-06-11 NOTE — PROGRESS NOTES
facial paralysis. She was taken to the OR on 2/26/20 by Dr. Kirsten Mace for biopsy of the mass, and right lower eyelid ectropion repair. Biopsy demonstrated metastatic adenocarcinoma, morphologically similar to her prior liver biopsy. Mary Jo Badillo presents in consultation accompanied by her son. Her symptoms began abruptly on 12/1/19. It was initially thought to be bell's palsy, but on initially ENT evaluation was thought to represent paralysis from tumor. Her paralysis has been unchanged since the time of onset. She has throbbing pain in the inside of the right ear and in the right post-auricular space. She is using oxycodone for pain control. She has some intermittent bloody discharge from the right external ear canal. She has subjective hearing loss on the right. She has noticed a change in taste over the past week, with some associated decrease in taste. She is getting her systemic therapy at [facility] in [place], [place]. She is unsure what therapy she is on (gemcitabine/oxaliplatin was initially recommended by Dr. Guille Servin at [facility]). Per the patient, she progressed following several cycles of chemotherapy and has recently missed several administrations.          -----          Mary Jo Badillo is a 69 y.o. female evaluated via telephone on 6/11/2020. Consent:  She and/or health care decision maker is aware that she may receive a bill for this telephone service, depending on her insurance coverage, and has provided verbal consent to proceed: Yes      Documentation:  I communicated with the patient and/or health care decision maker about met ca. Details of this discussion including any medical advice provided: recurrent pain      I affirm this is a Patient Initiated Episode with an Established Patient who has not had a related appointment within my department in the past 7 days or scheduled within the next 24 hours.   I also affirm that the pt is at home and that I, during this visit, am in my office at status:  recurrence   -SE:  Face pain  -FU plans:  Sim for re-treatment, ongoing FOLFOX, DIAG CT head ordered   -orders to sched: SIM          -I recommend re-irradiation for palliation to the right mandible. The risks, benefits, alternatives, process and logistics of external beam radiation were reviewed today. We answered all of the patient's questions to the best of our ability. Christiano Friday verbalized understanding and seemed satisfied. Radiation planning will commence within 3 days; the next step in management being the simulation scan, with external beam radiation to commence in a timely fashion thereafter. *I spent at least 15 MIN on this case  (as above) and with this patient today including personally performing/reviewing the chart review, history, ROS, and providing a summary and description of the detailed medical decision making as a basis for any and all recommendations made today (+/- a pertinent RBA discussion): literature and radiology reviews were performed as noted and applicable (13% or more time was spent in direct patient ) - as well as appropriate FU orders. Jessie Geiger. Trish Patricia MD [place] [address] Oncology  Cell: [phone]  [facility]:  [phone]   FAX: [phone] [address] [facility]:   [address] [place] [address] Avenue:    [phone]  [address] [place] [address] Road:  [phone]   FAX:  [phone]  Email: [email or web address]      NOTE: This report was transcribed using voice recognition software. Every effort was made to ensure accuracy; however, inadvertent computerized transcription errors may be present.

## 2020-06-29 NOTE — TELEPHONE ENCOUNTER
Spoke with patient to let them know that their medication refills were called into their pharmacy by NATASHA Crespo CNP. Confirmed appointment next Tuesday 7/7.

## 2020-07-01 NOTE — PROGRESS NOTES
Lemuel Ashraf  7/1/2020  Wt Readings from Last 3 Encounters:   07/01/20 131 lb 3.2 oz (59.5 kg)   06/11/20 130 lb (59 kg)   05/26/20 131 lb (59.4 kg)     Body mass index is 23.24 kg/m². Treatment Area:right skull base/face    Patient was seen today for weekly visit. Comfort Alteration  KPS:70%  Fatigue: Mild    Mucous Membrane Alteration  Mucositis Due to Radiation: No  Thrush: No  Voice Changes: No    Nutritional Alteration  Anorexia: No   Nausea: No   Vomiting: No     Skin Alteration   Sensation:na    Radiation Dermatitis:  na    Ventilation Alteration  Cough:No    Emotional  Coping: effective    Injury, potential bleeding or infection: na    Radioprotectant Tolerance  Yes            Lab Results   Component Value Date    WBC 20.7 (H) 05/26/2020     05/26/2020         /76   Pulse 94   Temp 96.8 °F (36 °C) (Temporal)   Wt 131 lb 3.2 oz (59.5 kg)   SpO2 98%   BMI 23.24 kg/m²   BP within normal range? yes        Assessment/Plan: Patient has completed 3/12 fractions, 750 cGy/3000.     Jael Patel
Readings from Last 3 Encounters:   07/01/20 131 lb 3.2 oz (59.5 kg)   06/11/20 130 lb (59 kg)   05/26/20 131 lb (59.4 kg)         ASSESSMENT/PLAN:     Patient is tolerating treatments well with expected toxicities. RBA were reviewed prior to first fraction and PRN. Current and planned dose reviewed. Goals of treatment and potential side effects were reviewed with the patient PRN. Treatment imaging has been personally reviewed for accuracy and precision. Questions answered to apparent satisfaction. Treatments will continue as planned. Enos Snellen.  Johnny Batres MD MS DABR  Radiation Oncologist        PHYSICIANS [facility] ([address] [place] [address]): [phone] /// FAX: [phone]  [facility]): [phone] /// FAX: [phone]  [address] [place] [address]): [phone] /// FAX: [phone]

## 2020-07-01 NOTE — PATIENT INSTRUCTIONS
Continue daily fractionated radiation therapy as scheduled. Please see weekly OTV note and intial consultation letter in [facility] for clinical details. Enos Snellen. Johnny Batres MD MS [facility]:  [phone]   FAX: [phone] [address] [facility]:  [phone]   FAX:    [phone]  [address] [place] [address] Road:  [phone]   FAX:  [phone]  Email: [email or web address]

## 2020-07-07 NOTE — PROGRESS NOTES
Palliative Care Department  Palliative Care Progress Note  Provider: Lucy KHOURY-CNP    Referring Provider:  Dr. Nicol Chaparro    Location of Service:   [address] [place] [address]    Reason for Consult:  []  Code status Discussion  []  Assist with goals of care  []  Psychosocial support  [x]  Symptom Management  []  Advanced Care Planning    Chief Complaint: Mercedez Rey is a 69 y.o. female with chief complaint of pain, nausea    Assessment/Plan      Stage IV intrahepatic cholangiocarcinoma:   -  Dx in 5/2019, work up completed at [facility]   -  Following locally with Dr. Lorena Chacon of the [facility]    -  Was on palliative cisplatin/gemcitabine started in July 2019   -  Most recently on FOLFOX with dose reduction   -  Treatment currently on hold   -  Mmetastatic right mastoid tumor   -  Palliative Radiation therapy to her right mastoid tumor completed   -  Chemotherapy on hold during radiation therapy   -  Now receiving palliative radiation therapy again, planned for 12 fractions    Pain due to neoplasm:   -  Hydrocodone 10/325 mg Q 4 PRN, using 3-4 per day   -  Gabapentin 500 mg HS    Nausea and Vomiting:   -  Using Zofran and Compazine    Palliative Care Encounter:      - Goals of care: continue current management     - Code Status: Not discussed during this encounter    Prognosis: unknown    Referrals to: none today    Follow Up:  2 weeks. They were encouraged to call with any questions, concerns, needs, or changes in symptoms. Subjective:     HPI:  Mercedez Rey is a 69 y.o. female with significant past medical history of remote breast CA s/p mastectomy and reconstruction [id number], vulvar cancer s/p multiple resections, last in 2015, and perianal HG LETICIA/SCC in 2019, with stage IV intrahepatic cholangiocarcinoma Dx in 5/2019 with metastatic pulmonary nodules, managed by the [facility] with Dr. Lorena Chacon.   She developed sudden facial paralysis in December of 2019, and was found adenocarcinoma, morphologically similar to her prior liver biopsy. Ale Parson presents in consultation accompanied by her son. Her symptoms began abruptly on 12/1/19. It was initially thought to be bell's palsy, but on initially ENT evaluation was thought to represent paralysis from tumor. Her paralysis has been unchanged since the time of onset. She has throbbing pain in the inside of the right ear and in the right post-auricular space. She is using oxycodone for pain control. She has some intermittent bloody discharge from the right external ear canal. She has subjective hearing loss on the right. She has noticed a change in taste over the past week, with some associated decrease in taste. She is getting her systemic therapy at [facility] in [place], [place]. She is unsure what therapy she is on (gemcitabine/oxaliplatin was initially recommended by Dr. Peyton Gupta at [facility]). Per the patient, she progressed following several cycles of chemotherapy and has recently missed several administrations. The patient presents today to discuss fractionated external beam radiation therapy as a component of multidisciplinary, palliative management. We reviewed the available medical records including the complete medical history of this pt today prior to consultation. Epic -CE and available scanned documents per the Epic Media tab were reviewed PRN. A complete ROS was also performed today and is noted below. During consultation today I personally discussed the pts workup to date; including but not limited to applicable imaging studies, Pathology reports, and interventions. The NCCN guidelines, as pertaining to the above diagnosis were also recapped for the pt today in brief. Today, Ale Parson  notes Sx that include R face neurological deficit. KPS 70. As Per Dr. Parmjit Licona on 3/5/2020:            Subjective/Events/Discussions:  3/23/2020:  Mrs. Billie Esposito is seen today in the clinic with her son present.  She is alert and oriented, and able to voice her needs and concerns well. I introduced myself and the PM clinic. We discussed the role PM may plan in her care, and discussed her symptoms at length. He primary complains today are pain in her right ear with radiation down her right neck. She describes it as throbbing and pounding. She states that she has been using Oxycodone 5 mg with no improvement in her pain. We discussed options and will prescribe Hydrocodone 10/325 mg Q 4 PRN and gabapentin liquid, due to dysphagia, with goal to increase in a stepwise fashion to 500 mg HS. She second complaint is that of nausea, with vomiting this last 2 days, and this is associated with dizziness. We will trial scopolamine patch. We will follow along closely. I asked that they call later this week to let me know how she is doing with the changes. We will have her return in 2 week for reassessment. 4/7/2020: A telephonic virtual visit was completed via telephone with Quincy Velazquez today regarding the issues listed above. She is alert and oriented, and able to voice her needs and concerns well, and there were no signs of sedation confusion. She states that overall her pain has improved, especially following her surgery last week. She states that she is continued to utilize hydrocodone, typically using 3 to 4 tablets/day, as well as gabapentin 500 mg at nighttime, and she states she feels that this regimen is working very well to manage her pain. At this time she denies any other significant trouble, we will not make any changes to her plan of care. We will provide a refill of her hydrocodone, and she will continue gabapentin as previous prescribed. We will plan to follow-up with her in approximately 4 weeks to reassess her needs, she is encouraged to call if she has any questions, concerns, or changes in her symptoms prior to next encounter. 5/11/2020:   A telephonic virtual visit was completed via telephone with Manan Briseno today regarding the issues listed above. She is alert and oriented, and able to voice her needs and concerns well, and there were no signs of sedation confusion. She states that overall her pain has improved, especially in her ear and neck, with this pain being resolved at this time. She reports increased myalgias and joint pain, especially in her knees, which began after getting injections to increase her blood counts. She states that she is continued to utilize hydrocodone, typically using 3 to 4 tablets/day, as well as gabapentin 500 mg at nighttime, and she states she feels that this regimen is working very well to manage her pain. She additionally complains today of significant fatigue and decreased appetite with weight loss. She reports that she eats some during the day, but often does not finish meals. She is not using any dietary supplements at this time. She is encouraged to use of dietary supplementation, and we will start trial of Megace for her appetite. Otherwise she denies any other significant trouble, we will not make any further changes to her plan of care. We will provide a refill of her hydrocodone, and she will continue gabapentin as previous prescribed. We will plan to follow-up with her in approximately 4 weeks to reassess her needs, she is encouraged to call if she has any questions, concerns, or changes in her symptoms prior to next encounter. 5/26/2020: A telephonic virtual visit was completed via telephone with Manan Briseno today regarding the issues listed above. She is alert and oriented, and able to voice her needs and concerns well, and there were no signs of sedation confusion. She states that overall her pain has is unchanged. She continues to have myalgias and joint pain, especially in her knees.   She states that she is continued to utilize hydrocodone, typically using 3 to 4 tablets/day, as well as gabapentin 500 mg at nighttime, and she otherwise has no complaints, and states that she has no needs today. We will plan to follow-up with her in approximately 4 weeks to reassess her needs, and as always she was encouraged to call with any questions, concerns, or changes in her symptoms prior to her next encounter.       2020:  Mrs. Abram Alvarado is seen in the clinic today with her son present. She is alert, oriented, able to voice her needs and concerns well, and shows no signs of sedation or confusion. She currently undergoing radiation therapy to her right mastoid tumor again, however chemotherapy is currently on hold. She is complaining of more edema in her lower extremities, weeping edema over left lower leg. She also has some abdominal bloating, appearing to have some ascites. They report that they were prescribed diuretics by her oncologist, and have a CAT scan scheduled for the near future to assess her abdomen. She does continue to have pain, now mostly in her abdomen, and utilizing Norco 10,025 mg 4 times per day, which she states is working well at managing her pain. She also is utilizing gabapentin 500 mg nightly, she states this helps as well. She does complain a bit more of some mild constipation, but denies feeling terribly bloated or abdominal cramps. She is utilizing Colace, and she is instructed to use MiraLAX as well. She is also instructed to utilize prune juice. At this time she does not require any refills, will not make any changes to her plan of care. I would like to follow-up with her closely, as I feel her lower extremity edema and abdominal bloating, with what appears to be ascites, is likely due to disease progression, and I would like to see her in 2 weeks to reassess her needs. As always I instructed them to call if they have any questions, concerns, or changes in her symptoms prior to her next encounter.     Pain Assessment   Ratin  Description: throbbing and pounding  Duration: weeks  Frequency: daily  Location: right ear and neck  Alleviating Factors: nothing  Exacerbating Factors: unable to associate with any factor  Effect: change in function, interference with activities, sleep and mood    Controlled substance prescribing discussion:  No history of chronic opioid use. Denies previous drug/alcohol/controlled substance abuse  Denies any current illicit drug use  We reviewed use of controlled substance contract and UDS and she agrees to comply. Past Medical History:   Diagnosis Date    Arthritis     Fracture of left radius 03/2018    History of cancer of right breast 2003    mastectomy    [facility]     Lung [facility]     Psoriasis     on Humira    Skin cancer 2015    vulva, surgery    Thyroid disease     Vulva cancer ([facility] 2005    surgery       Past Surgical History:   Procedure Laterality Date    COLONOSCOPY      ENDOSCOPY, COLON, DIAGNOSTIC      EYE SURGERY      bilateral detached retina    MASTECTOMY Right 2003    MD OPEN RX DISTAL RADIUS FX, EXTRA-ARTICULAR Left 3/28/2018    LEFT  DISTAL RADIUS  OPEN REDUCTION INTERNAL FIXATION performed by Tracey Fischer MD at [place] [address]. VULVECTOMY         Family History   Problem Relation Age of Onset    Breast Cancer Other         breast cancer in a daughter at age 27       ROS: UNLESS STATED ABOVE PATIENT DENIES:  CONSTITUTIONAL:  fever, chill, rigors, nausea, vomiting, fatigue. HEENT: blurry vision, double vision, hearing problem, tinnitus, hoarseness, dysphagia, odynophagia  RESPIRATORY: cough, shortness of breath, sputum expectoration. CARDIOVASCULAR:  Chest pain/pressure, palpitation, syncope, irregular beats  GASTROINTESTINAL:  abdominal or rectal pain, diarrhea, constipation, . GENITOURINARY:  Burning, frequency, urgency, incontinence, discharge  INTEGUMENTARY: rash, wound, pruritis  HEMATOLOGIC/LYMPHATIC:  Swelling, sores, gum bleeding, easy bruising, pica.   MUSCULOSKELETAL:  pain, identified. ;Assessed functional status. ;Obtaining appropriate analgesic effect of treatment. Chronic Pain > 50 MEDD -   Chronic Pain > 80 MEDD -       Lucy Munch APRN-CNP  Palliative Medicine    Time/Communication  Greater than 50% of time spent, total 45 minutes in face-to-face counseling and coordination of care regarding goals of care, symptom management, diagnosis and prognosis and see above. Discussed the plan of care with the other IDT members of the Palliative Care Team, and with patient and family. Thank you for allowing Palliative Medicine to participate in the care of Mercedez Rey. Note: This report was completed using [employer] voiced recognition software. Every effort has been made to ensure accuracy; however, inadvertent computerized transcription errors may be present.

## 2020-07-08 NOTE — PROGRESS NOTES
Mercedez Candido  7/8/2020  Wt Readings from Last 3 Encounters:   07/08/20 131 lb 4.8 oz (59.6 kg)   07/07/20 131 lb (59.4 kg)   07/01/20 131 lb 3.2 oz (59.5 kg)     Body mass index is 23.26 kg/m². Treatment Area:right skull base/face    Patient was seen today for weekly visit. Comfort Alteration  KPS:70%  Fatigue: Moderate    Mucous Membrane Alteration  Mucositis Due to Radiation: No  Thrush: No  Voice Changes: No    Nutritional Alteration  Anorexia: No   Nausea: No   Vomiting: No     Skin Alteration   Sensation:na    Radiation Dermatitis:  na    Ventilation Alteration  Cough:No    Emotional  Coping: effective    Injury, potential bleeding or infection: na    Radioprotectant Tolerance  Yes            Lab Results   Component Value Date    WBC 20.7 (H) 05/26/2020     05/26/2020         /64   Pulse 86   Temp 98.1 °F (36.7 °C) (Temporal)   Resp 18   Wt 131 lb 4.8 oz (59.6 kg)   SpO2 98%   BMI 23.26 kg/m²   BP within normal range? yes         Assessment/Plan: patient has completed 7/12 fractions, 1750 cGy/3000.     Liane Sacks

## 2020-07-08 NOTE — PROGRESS NOTES
DEPARTMENT OF RADIATION ONCOLOGY ON TREATMENT VISIT         7/8/2020      NAME:  Sera Abel    YOB: 1945    Diagnosis: met ca - skull base, re- tx    SUBJECTIVE:   Sera Abel has now received fractionated external beam radiation therapy - ongoing. Past medical, surgical, social and family histories reviewed and updated as indicated. Pain: controlled      ALLERGIES:  Patient has no known allergies. Current Outpatient Medications   Medication Sig Dispense Refill    HYDROcodone-acetaminophen (NORCO)  MG per tablet Take 1 tablet by mouth every 6 hours as needed for Pain for up to 30 days. 120 tablet 0    potassium chloride (KLOR-CON M) 20 MEQ extended release tablet Take 1 tablet by mouth 2 times daily for 3 days 6 tablet 0    magnesium cl-calcium carbonate (SLOW-MAG) 71.5-119 MG TBEC tablet Take 2 tablets by mouth daily for 3 days 6 tablet 0    dexamethasone (DECADRON) 4 MG tablet Take 4 mg by mouth 2 times daily (with meals)      gabapentin (NEURONTIN) 250 MG/5ML solution Take 500 mg by mouth nightly.  apixaban (ELIQUIS) 5 MG TABS tablet Take 5 mg by mouth 2 times daily       alendronate (FOSAMAX) 70 MG tablet Take 70 mg by mouth every 7 days Sunday      docusate sodium (COLACE) 100 MG capsule Take 100 mg by mouth 2 times daily as needed for Constipation      ondansetron (ZOFRAN) 8 MG tablet Take 8 mg by mouth every 8 hours as needed for Nausea or Vomiting      levothyroxine (SYNTHROID) 75 MCG tablet Take 75 mcg by mouth Six times weekly Monday, Tuesday, Wednesday, Thursday, Friday, & Saturday  0     No current facility-administered medications for this encounter. OBJECTIVE:  Alert and fully ambulatory. Pleasant and conversant. Physical Examination: General appearance - alert, well appearing, and in no distress.           Wt Readings from Last 3 Encounters:   07/08/20 131 lb 4.8 oz (59.6 kg)   07/07/20 131 lb (59.4 kg)   07/01/20 131 lb 3.2 oz (59.5 kg)         ASSESSMENT/PLAN:     Patient is tolerating treatments well with expected toxicities. RBA were reviewed prior to first fraction and PRN. Current and planned dose reviewed. Goals of treatment and potential side effects were reviewed with the patient PRN. Treatment imaging has been personally reviewed for accuracy and precision. Questions answered to apparent satisfaction. Treatments will continue as planned. Yordy Whitley.  Nikhil Aguilar MD MS DABR  Radiation Oncologist        [facility] ([address] [place] [address]): [phone] /// FAX: [phone]  [facility]): [phone] /// FAX: [phone]  [address] [place] [address]): [phone] /// FAX: [phone]

## 2020-07-16 NOTE — PROGRESS NOTES
Licha Michael  7/16/2020  7:54 AM          Current Outpatient Medications   Medication Sig Dispense Refill    HYDROcodone-acetaminophen (NORCO)  MG per tablet Take 1 tablet by mouth every 6 hours as needed for Pain for up to 30 days. 120 tablet 0    potassium chloride (KLOR-CON M) 20 MEQ extended release tablet Take 1 tablet by mouth 2 times daily for 3 days 6 tablet 0    magnesium cl-calcium carbonate (SLOW-MAG) 71.5-119 MG TBEC tablet Take 2 tablets by mouth daily for 3 days 6 tablet 0    dexamethasone (DECADRON) 4 MG tablet Take 4 mg by mouth 2 times daily (with meals)      gabapentin (NEURONTIN) 250 MG/5ML solution Take 500 mg by mouth nightly.  apixaban (ELIQUIS) 5 MG TABS tablet Take 5 mg by mouth 2 times daily       alendronate (FOSAMAX) 70 MG tablet Take 70 mg by mouth every 7 days Sunday      docusate sodium (COLACE) 100 MG capsule Take 100 mg by mouth 2 times daily as needed for Constipation      ondansetron (ZOFRAN) 8 MG tablet Take 8 mg by mouth every 8 hours as needed for Nausea or Vomiting      levothyroxine (SYNTHROID) 75 MCG tablet Take 75 mcg by mouth Six times weekly Monday, Tuesday, Wednesday, Thursday, Friday, & Saturday  0     No current facility-administered medications for this encounter. This is an up-to-date medication list.    Please take this list to your next care provider, and discard any previous medication lists.
Radiation Oncology   Radiation Therapy Treatment Summary   Michaela Zamora. Fiona Arcos MD MS IAN Granados  1945                         69 y.o. Brief History:    - skull base met - re treatment    Amberly Granados has completed radiation treatment to the  with mixed mode photons using a IG 3D technique. The patient was prescribed a dose of 3000 cGy in 12 fractions ( +/ - / including boost pending clinical characteristics and applicable NCCN guidelines; total dose recorded per [facility] record). If indicated; CBCT daily image guidance / motion management (IBNLT 4D planning) / ABC / surface guidance +/- DIBH - applied PRN per NANETTE and RTOG standards. Treatment Start Date:  6/29/20  Treatment Completion Date:  7/16/20    The treatments were tolerated, without significant interruption. RTOG Acute Toxicity Grade [ARMSC]: -. (-)  -risks of re treatment reviewed prior to RT and during [address],  Pt verbalized understanding      We will look forward seeing the patient back 3 mo or sooner PRN for follow-up with myself or our NP [scheduled and PRN toxicity checks in addition]. The patient knows to call with any questions or concerns. Please feel free to contact me at either office to discuss the care of this patient, or if I can be of any further assistance. Michaela Zamora. Fiona Arcos MD [address] [place] [address]  Radiation Oncology     c:  [phone]  [email or web address]  -[address] ([address] [place] [address]):  o: [phone]  f:  [phone]  [facility]):  o: [phone]  f:  [phone]  -[facility]):  o: [phone]  f:  [phone]    NOTE: This report was transcribed using voice recognition software. Every effort was made to ensure accuracy; however, inadvertent computerized transcription errors may be present.
07/08/20 131 lb 4.8 oz (59.6 kg)   07/07/20 131 lb (59.4 kg)         ASSESSMENT/PLAN:     Patient is tolerating treatments well with expected toxicities. RBA were reviewed prior to first fraction and PRN. Current and planned dose reviewed. Goals of treatment and potential side effects were reviewed with the patient PRN. Treatment imaging has been personally reviewed for accuracy and precision. Questions answered to apparent satisfaction. Treatments will continue as planned. -KOBY Gonzalez.  Gisele Jacobsen MD MS DABR  Radiation Oncologist        [facility] ([address] [place] [address]): [phone] /// FAX: [phone]  [facility]): [phone] /// FAX: [phone]  [facility]): [phone] /// FAX: [phone]

## 2020-07-17 NOTE — TELEPHONE ENCOUNTER
Palliative Medicine  Progress Note    Call received from the patient's son, stating she is having more pain in her ear and abdomen as well as abdominal distention. He is instructed to increase Norco frequency to Q 4 hours PRN. If pain continues uncontrolled/worsens, especially in the abdomen, or if distention worsens he is instructed to go to the ER. She is for f/u with me on Monday. Nima Danielson APRN-CNP  Palliative Medicine    Note: This report was completed using [employer] voiced recognition software. Every effort has been made to ensure accuracy; however, inadvertent computerized transcription errors may be present.

## 2020-07-20 NOTE — PROGRESS NOTES
Palliative Care Department  Palliative Care Progress Note  Provider: Nima Danielson APRN-CNP    Referring Provider:  Dr. Lillie Sena    Location of Service:   [address] [place] [address]    Reason for Consult:  []  Code status Discussion  []  Assist with goals of care  []  Psychosocial support  [x]  Symptom Management  []  Advanced Care Planning    Chief Complaint: Amberly Granados is a 69 y.o. female with chief complaint of pain, nausea    Assessment/Plan      Stage IV intrahepatic cholangiocarcinoma:   -  Dx in 5/2019, work up completed at [facility]   -  Following locally with Dr. Radha Lora of the [facility]    -  Was on palliative cisplatin/gemcitabine started in July 2019   -  Most recently on FOLFOX with dose reduction   -  Treatment to resume soon   -  Metastatic right mastoid tumor   -  Palliative Radiation therapy to her right mastoid tumor completed   -  Recently completed palliative radiation therapy again    Pain due to neoplasm:   -  Oxycodone 10 mg Q 4 PRN   -  Gabapentin 250 mg BID, may increase to 500 mg BID    Nausea and Vomiting:   -  Using Zofran and Compazine    Constipation   -  Senna-S 2 tab BID    Urinary Retention:   -  Refer to Urology     Ascites/Abdominal Distention:   -  Will follow up with oncology   -  ? Need for paracentesis or diuretics   -  Recent CT of the Abdomen, not available to me at this time    Palliative Care Encounter:      - Goals of care: continue current management     - Code Status: Not discussed during this encounter    Prognosis: unknown    Referrals to: none today    Follow Up:  4 weeks. They were encouraged to call with any questions, concerns, needs, or changes in symptoms.     Subjective:     HPI:  Amberly Granados is a 69 y.o. female with significant past medical history of remote breast CA s/p mastectomy and reconstruction [id number], vulvar cancer s/p multiple resections, last in 2015, and perianal HG LETICIA/SCC in 2019, with stage IV intrahepatic neurological deficit. KPS 70. As Per Dr. Sarai Grubbs on 3/5/2020:            Subjective/Events/Discussions:  3/23/2020:  Mrs. Shane Reynolds is seen today in the clinic with her son present. She is alert and oriented, and able to voice her needs and concerns well. I introduced myself and the PM clinic. We discussed the role PM may plan in her care, and discussed her symptoms at length. He primary complains today are pain in her right ear with radiation down her right neck. She describes it as throbbing and pounding. She states that she has been using Oxycodone 5 mg with no improvement in her pain. We discussed options and will prescribe Hydrocodone 10/325 mg Q 4 PRN and gabapentin liquid, due to dysphagia, with goal to increase in a stepwise fashion to 500 mg HS. She second complaint is that of nausea, with vomiting this last 2 days, and this is associated with dizziness. We will trial scopolamine patch. We will follow along closely. I asked that they call later this week to let me know how she is doing with the changes. We will have her return in 2 week for reassessment. 4/7/2020: A telephonic virtual visit was completed via telephone with Manan Briseno today regarding the issues listed above. She is alert and oriented, and able to voice her needs and concerns well, and there were no signs of sedation confusion. She states that overall her pain has improved, especially following her surgery last week. She states that she is continued to utilize hydrocodone, typically using 3 to 4 tablets/day, as well as gabapentin 500 mg at nighttime, and she states she feels that this regimen is working very well to manage her pain. At this time she denies any other significant trouble, we will not make any changes to her plan of care. We will provide a refill of her hydrocodone, and she will continue gabapentin as previous prescribed.   We will plan to follow-up with her in approximately 4 weeks to reassess her needs, she is encouraged to call if she has any questions, concerns, or changes in her symptoms prior to next encounter. 5/11/2020: A telephonic virtual visit was completed via telephone with Lilia Rodriguez today regarding the issues listed above. She is alert and oriented, and able to voice her needs and concerns well, and there were no signs of sedation confusion. She states that overall her pain has improved, especially in her ear and neck, with this pain being resolved at this time. She reports increased myalgias and joint pain, especially in her knees, which began after getting injections to increase her blood counts. She states that she is continued to utilize hydrocodone, typically using 3 to 4 tablets/day, as well as gabapentin 500 mg at nighttime, and she states she feels that this regimen is working very well to manage her pain. She additionally complains today of significant fatigue and decreased appetite with weight loss. She reports that she eats some during the day, but often does not finish meals. She is not using any dietary supplements at this time. She is encouraged to use of dietary supplementation, and we will start trial of Megace for her appetite. Otherwise she denies any other significant trouble, we will not make any further changes to her plan of care. We will provide a refill of her hydrocodone, and she will continue gabapentin as previous prescribed. We will plan to follow-up with her in approximately 4 weeks to reassess her needs, she is encouraged to call if she has any questions, concerns, or changes in her symptoms prior to next encounter. 5/26/2020: A telephonic virtual visit was completed via telephone with Lilia Rodriguez today regarding the issues listed above. She is alert and oriented, and able to voice her needs and concerns well, and there were no signs of sedation confusion. She states that overall her pain has is unchanged.   She continues to have myalgias and joint pain, especially in her knees. She states that she is continued to utilize hydrocodone, typically using 3 to 4 tablets/day, as well as gabapentin 500 mg at nighttime, and she states she feels that this regimen is working very well to manage her pain. She continues to have some fatigue, but states appetite has significantly improved since her last encounter, and she is continue to utilize Megace 400 mg daily. She does additionally complain today of some constipation, states that she has been utilizing Colace 2 tablets nightly with little improvement. We discussed the addition of MiraLAX, she states that she has this at home and will try this daily in addition to the Colace. Otherwise she denies any other significant trouble, and we will not make any further changes to her plan of care, and she does not require any refills of her medications today. We will plan to follow along closely with her and will plan to see her again in approximately 2 weeks to reassess her needs. As always she is encouraged to call if she has any questions, concerns, or changes in her symptoms prior to next encounter. 6/9/2020: A telephonic virtual visit was completed via telephone with Lilia Rodriguez today regarding the issues listed above. She is alert and oriented, and able to voice her needs and concerns well, and there were no signs of sedation confusion. She states that overall she is doing fair, with some pain in her right ear, she states she believes she may have a new tumor in her right ear again. She is to follow-up with Dr. Kendra Steele regarding this. She continues to follow closely with the [facility] as well. She is continued utilize her hydrocodone  mg using this about 2-4 times per day, she states that she does not need a refill of this at this time. She also continues with gabapentin 500 mg at nighttime unchanged.   Overall she states that she has not had any increase in her fatigue and they have any questions, concerns, or changes in her symptoms prior to her next encounter. 7/20/2020:  Meme Matias presents today accompanied by her son. She is alert, only, her voice needs concerns well, does not show signs sedation, confusion, or any acute distress. She is noted to have some abdominal distention, as well as bilateral lower extremity edema. She continues complain of pain, primarily located in her right ear and face with radiation to her neck, but additionally complains of some generalized abdominal discomfort as well. She continues utilize Norco  mg every 4 hours PRN, utilizing 5-6 times per day, and reporting very little improvement in her pain. She also continues with gabapentin which she reports she believes that she is utilizing 250 mg nightly, though she had previously been instructed to increase the dose to 500 mg nightly. At this time she is just uncertain of the exact dose she is utilizing. We discussed options, and she will stop the Norco, will prescribe oxycodone 10 mg every 4 hours as needed, as well as she is instructed to increase her current gabapentin dose to twice daily, and she is instructed that if she is indeed utilizing 250 mg nightly, to increase it to 250 mg twice daily, then over the course of the next few weeks increase the dose to goal of 500 mg twice daily. She additionally complains today of some constipation, stating that she has not noticed any significant improvement utilizing prune juice, or making any dietary changes. We discussed options for this, will prescribe Francoise-Colace 2 tablets twice daily. She additionally complains of urinary retention, stating that she frequently will feel as though she needs to empty her bladder, but then will only be able to empty a very small amount, and still feel as though her bladder is full.   She does complain of some suprapubic tenderness, though it is difficult to assess if she has any bladder distention, as her abdomen is distended at this time. We discussed this at length, and I encouraged her to see urology regarding this, and she states that she would be agreeable to a referral.  Referral was placed for urology today. Discussed options for further management, we otherwise will not make any further changes to her plan of care. We will plan to see her again in approximately 4 weeks to reassess, as always she is encouraged to call if there are any questions, concerns, or change in her symptoms prior to her next encounter. Pain Assessment   Ratin  Description: throbbing and pounding  Duration: weeks  Frequency: daily  Location: right ear and neck  Alleviating Factors: nothing  Exacerbating Factors: unable to associate with any factor  Effect: change in function, interference with activities, sleep and mood    Controlled substance prescribing discussion:  No history of chronic opioid use. Denies previous drug/alcohol/controlled substance abuse  Denies any current illicit drug use  We reviewed use of controlled substance contract and UDS and she agrees to comply.       Past Medical History:   Diagnosis Date    Arthritis     Fracture of left radius 2018    History of cancer of right breast 2003    mastectomy    [facility]     [facility]     Psoriasis     on Humira    Skin cancer     vulva, surgery    Thyroid disease     [facility]    surgery       Past Surgical History:   Procedure Laterality Date    COLONOSCOPY      ENDOSCOPY, COLON, DIAGNOSTIC      EYE SURGERY      bilateral detached retina    MASTECTOMY Right     [place] OPEN RX DISTAL RADIUS FX, EXTRA-ARTICULAR Left 3/28/2018    LEFT  DISTAL RADIUS  OPEN REDUCTION INTERNAL FIXATION performed by Jody Wagner MD at [facility]. VULVECTOMY         Family History   Problem Relation Age of Onset    Breast Cancer Other         breast cancer in a daughter at age 33       ROS: UNLESS STATED ABOVE PATIENT DENIES:  CONSTITUTIONAL:  fever, chill, rigors, nausea, vomiting, fatigue. HEENT: blurry vision, double vision, hearing problem, tinnitus, hoarseness, dysphagia, odynophagia  RESPIRATORY: cough, shortness of breath, sputum expectoration. CARDIOVASCULAR:  Chest pain/pressure, palpitation, syncope, irregular beats  GASTROINTESTINAL:  abdominal or rectal pain, diarrhea, constipation, . GENITOURINARY:  Burning, frequency, urgency, incontinence, discharge  INTEGUMENTARY: rash, wound, pruritis  HEMATOLOGIC/LYMPHATIC:  Swelling, sores, gum bleeding, easy bruising, pica.   MUSCULOSKELETAL:  pain, edema, joint swelling or redness  NEUROLOGICAL:  light headed, dizziness, loss of consciousness, weakness, change in memory, seizures, tremors    Objective:     Physical Exam  /80   Pulse 89   Wt 130 lb (59 kg)   SpO2 94% Comment: RA  BMI 23.03 kg/m²     Gen:  Alert, chronically ill-appearing, in no acute distress  HEENT:  Normocephalic, conjunctiva pink, no drainage, mild mucositis  Neck:  Supple  Lungs:  CTA bilaterally, no audible rhonchi or wheezes noted  Heart[de-identified]  RRR, no murmur, rub, or gallop noted during exam  Abd:  Soft, tender, distended, small umbilical hernia noted, BS+  M/S/Ext:  Moving all extremities, +2 BLE edema, pulses present  Skin:  Warm and dry  Neuro:  PERRL, Alert, oriented x 3; following commands    [place] Symptom Assessment Score   [place] Score 7/20/2020 7/7/2020 6/9/2020 5/26/2020 5/11/2020   Pain Score 8 8 5 5 5   Tiredness Score Worst possible tiredness 9 6 7 9   Nausea Score Not nauseated Not nauseated Not nauseated Not nauseated Not nauseated   Depression Score Not depressed Not depressed Not depressed Not depressed Not depressed   Anxiety Score Not anxious Not anxious Not anxious Not anxious Not anxious   Drowsiness Score Not drowsy Not drowsy 1 2 3   Appetite Score Best appetite Best appetite 2 4 7   Wellbeing Score 4 Best feeling of wellbeing 3 3 3 Dyspnea Score No shortness of breath No shortness of breath No shortness of breath No shortness of breath No shortness of breath   Other Problem Score 4 5 1 3 Best possible response   Total Assessment Score(calculated) 26 22 18 24 27     Assessed by: patient and provider. Current Medications:  Medications reviewed: yes    Controlled Substances Monitoring: [place]AR[place] reviewed 7/20/20. RX Monitoring 7/20/2020   Periodic Controlled Substance Monitoring Possible medication side effects, risk of tolerance/dependence & alternative treatments discussed. ;No signs of potential drug abuse or diversion identified. ;Assessed functional status. Chronic Pain > 50 MEDD Re-evaluated the status of the patient's underlying condition causing pain. Chronic Pain > 80 MEDD -       Usha KHOURY-CNP  Palliative Medicine    Time/Communication  Greater than 50% of time spent, total 45 minutes in face-to-face counseling and coordination of care regarding goals of care, symptom management, diagnosis and prognosis and see above. Discussed the plan of care with the other IDT members of the Palliative Care Team, and with patient and family. Thank you for allowing Palliative Medicine to participate in the care of Melina Arnold. Note: This report was completed using JayCut voiced recognition software. Every effort has been made to ensure accuracy; however, inadvertent computerized transcription errors may be present.

## 2020-08-17 NOTE — PROGRESS NOTES
with significant past medical history of Breast cancer and Intrahepatic cholangiocarcinoma. She has chemotherapy along with radiation treatment for her Jaw. She said her pain is much more tolerable after she uses her pain med. She only uses it when it becomes severe, described as throbbing in nature. She has many pills left over from her previous refill. She is no longer complaining of shooting pain, that would require us to increase her gabapentin dose, or use another medication. She complains of diarrhea, and constipation. She knows the Oxycodone causes constipation, so she will take is seldomly. She became emotional during our encounter, asking why is this happening to me. I comforted her, and gave encouraging words. She has imbalance and weakness in her legs, which has significantly impacted her walking ability. I would recommend her to have a wheel chair. Her leg edema has gone down, she is not requiring any ACE bandages around her legs at this time. Past Medical History:   Diagnosis Date    Arthritis     Fracture of left radius 03/2018    History of cancer of right breast 2003    mastectomy    [facility]     Lung cancer ([facility]     Psoriasis     on Humira    Skin cancer 2015    vulva, surgery    Thyroid disease     Vulva cancer ([facility] 2005    surgery       Past Surgical History:   Procedure Laterality Date    COLONOSCOPY      ENDOSCOPY, COLON, DIAGNOSTIC      EYE SURGERY      bilateral detached retina    MASTECTOMY Right 2003    [place] OPEN RX DISTAL RADIUS FX, EXTRA-ARTICULAR Left 3/28/2018    LEFT  DISTAL RADIUS  OPEN REDUCTION INTERNAL FIXATION performed by Adrian Suero MD at [facility]. VULVECTOMY         Family History   Problem Relation Age of Onset    Breast Cancer Other         breast cancer in a daughter at age 27       ROS: UNLESS STATED ABOVE PATIENT DENIES:  CONSTITUTIONAL:  fever, chill, rigors, nausea, vomiting, fatigue.   HEENT:

## 2020-08-31 NOTE — TELEPHONE ENCOUNTER
Spoke with patient to let them know that their medication refills were called into their pharmacy.     10/12/2020

## 2020-08-31 NOTE — PROGRESS NOTES
RADIATION ONCOLOGY  6 week follow up         8/31/2020      NAME:  Cooper Unger    YOB: 1945    CC: Pt returns today for re-assessment of radiation treatment area. Diagnosis:  Secondary bone- Rskull base/ recurrence. Subjective: On 07/16/2020, Dee Dee Ward completed 3000 cGy in 12 fractions directed to the right SB/ face. She present to clinic today for a 6 week post radiation completion symptom management visit. She is accompanied by her son Dae Kowalski at today's visit. Tiffani Chino denies skin complaints to treatment site. She does complain of throbbing pain to right ear, less than before, currently controlled with medications as ordered per Palliative Med. Reports right ear hearing loss- unchanged. Complains of dysgeusia- ongoing (patient still receiving chemotherapy); denies oral sores, difficulty swallowing or decreased appetite. No fevers or chills. No nausea or vomiting. Per both patient and son, patient is up throughout the day making meals, she enjoys cooking/ baking. Patient complains of fatigue- ongoing, unchanged. Patient complains of numbness and tingling to bilateral arms, Medical Oncology dose reduced chemo, patient reports symptoms improved (less than before). Patient reports bilateral lower extremity swelling and abdomen swelling. She did take a few days Lasix as prescribed per another healthcare provider. Patient reports minimal improvement in swelling. Dae Kowalski states patient recommended for paracentesis  \"but there's not enough fluid\" to perform procedure. Patient is following with:    Brad Florentino at the [facility] for [address]. Follow-up appointment scheduled for tomorrow- 09/01/2020. Palliative Med- PENG Tse-CNP. Next appointment scheduled  10/12/2020. Also following with specialty physicians as [facility]. Pain: controlled.      Past medical, surgical, social and family histories reviewed and updated as indicated. Cancer History: taken per Dr. Karine Colon consult note, dated 03/19/2020.  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 Current Outpatient Medications   Medication Sig Dispense Refill    zinc 50 MG CAPS Take 1 capsule by mouth daily      dexamethasone (DECADRON) 4 MG tablet Take 4 mg by mouth 2 times daily (with meals)      apixaban (ELIQUIS) 5 MG TABS tablet Take 5 mg by mouth 2 times daily       alendronate (FOSAMAX) 70 MG tablet Take 70 mg by mouth every 7 days Sunday      docusate sodium (COLACE) 100 MG capsule Take 100 mg by mouth 2 times daily as needed for Constipation      levothyroxine (SYNTHROID) 75 MCG tablet Take 75 mcg by mouth Six times weekly Monday, Tuesday, Wednesday, Thursday, Friday, & Saturday  0    gabapentin (NEURONTIN) 250 MG/5ML solution Take 10 mLs by mouth 2 times daily for 30 days. 600 mL 0    senna-docusate (PERICOLACE) 8.6-50 MG per tablet Take 2 tablets by mouth 2 times daily (Patient not taking: Reported on 8/17/2020) 120 tablet 2    ondansetron (ZOFRAN) 8 MG tablet Take 8 mg by mouth every 8 hours as needed for Nausea or Vomiting       No current facility-administered medications for this encounter.       Physical Examination:   Wt Readings from Last 3 Encounters:   08/17/20 124 lb (56.2 kg)   07/20/20 130 lb (59 kg)   07/16/20 134 lb (60.8 kg)       Alert, sitting upright in wheelchair. Pleasant and conversant. [place]. Irradiated skin shows no erythema. Lung clear to auscultation, no wheezes, rhonchi or crackles noted. Heart regular rate and rhythm. No murmur or gallop noted. Abdomen soft and distended. BS present. Bilateral lower extremities with 2+ pitting edema. ASSESSMENT/PLAN:     Patient completed a course of palliative XRT to right skull base/ face on 07/16/2020. Skin care discussed. Imaging per Medical Oncology. Symptom management per Palliative Medicine. I discussed follow up plans with Yue Subramanian. At this time, Dr. Cindy Henriquez will see the patient back in 3 months for a post-radiation completion follow-up visit. Yue Subramanian is to follow up with other physicians/ APPs involved in her care as directed (including but not limited to Medical Oncology, Otolaryngology, Palliative Care and Primary Care). The patient was given our contact number in the event that if at any time they change their mind and would like to return to the clinic to see either myself or one of the Radiation Oncologists, they can simply call us and we would be happy to see them sooner. Thank you for involving us in the management of this extremely pleasant patient. More than 15 min was in direct contact with pt coordinating/giving care. >50% of the visit was spent in counseling the pt on the following: Follow up care    The nurses notes were reviewed and incorporated into this assessment and plan. Questions answered to apparent satisfaction. Today's symptom management visit included in Radiation Oncology global charge period.        Teressa Hyatt, MSN, APRN-CNP  Certified Nurse Practitioner for [address] [place] [address]   Phone: [phone]/ Fax: [phone]

## 2020-10-12 NOTE — PROGRESS NOTES
Palliative Care Department  Palliative Care Telephone Encounter  Provider: Qamar ORTIZ    Naseem Kat is a 77 y.o. female evaluated via telephone on 10/12/2020. Consent:  She and/or health care decision maker is aware that that she may receive a bill for this telephone service, depending on her insurance coverage, and has provided verbal consent to proceed: Yes      Documentation:  I communicated with the patient and/or health care decision maker regarding fatigue, pain. Assessment/Plan      Stage IV intrahepatic cholangiocarcinoma:   -  Dx in 5/2019, work up completed at [facility] [place]   -  Following locally with Dr. Dino Oneal of the [facility]    -  Was on palliative cisplatin/gemcitabine started in July 2019   -  Most recently on FOLFOX with dose reduction   -  Treatment to resume soon   -  Metastatic right mastoid tumor   -  Palliative Radiation therapy to her right mastoid tumor completed   -  Recently completed palliative radiation therapy again    Pain due to neoplasm:   -  Oxycodone 10 mg Q 4 PRN   -  Gabapentin increased to 750 mg at bedtime and continue 500 mg in the a.m. Nausea and Vomiting:   -  Using Zofran and Compazine    Constipation   -  Senna-S 2 tab BID    Fatigue due to neoplasm:   -Trial methylphenidate 5 mg daily    Details of this discussion including any medical advice provided:   A telephonic virtual visit was completed via telephone with Naseem Kat, along with her son Dane Aranda, today regarding the issues listed above. She continues complain of pain primarily in her right ear, described as a constant throbbing, stating that she has found utilizing her oxycodone to be helpful, reporting using oxycodone anywhere from 3-5 times per day. She does state that she has had an increase in her right ear pain at nighttime, often waking with throbbing pain in her ear and needing to take a an oxycodone to gain relief.   She is additionally continue with gabapentin 500 mg twice daily.  Discussed options, and she will continue with oxycodone 10 mg every 4 hours as needed, and we will increase her gabapentin to 750 mg at bedtime, and continue with her 500 mg dose in the a.m. Her other significant complaint is that of severe fatigue, stating that she is having more incidences of being very tired, weak, and unable to get out of bed and function during the day. She states that today was significantly difficult, and was a contributing factor to her being unable to come in for an office visit today. She states that she is often so fatigued that she has little interest in doing anything throughout the day, including having meals. Her son reports that they had utilized energy drinks such as 5-hour energy, which do seem to give her a boost, but do not last very long or seem to be significantly helpful. I discouraged the use of these drinks at this time, discussed the option of a trial of a mild stimulant, such as methylphenidate. I did discuss the risks and benefits of using stimulant medication, including risk for decreased appetite and weight loss, and I encouraged him to watch very closely for this over the next few weeks as she tries methylphenidate 5 mg daily. Otherwise will not make any further changes to her plan of care, and we will plan to see her again approximate 4 weeks to reassess her needs. As always she and her son were encouraged to call if there are any questions, concerns, or any significant changes in her symptoms prior to her next encounter. I affirm this episode is with an Established Patient who has not had a related appointment within my department in the past 7 days or scheduled within the next 24 hours.     Total Time: minutes: 11-20 minutes    Juan Carlos KHOURY-SOM  [facility] Palliative Medicine    Note: not billable if this call serves to triage the patient into an appointment for the relevant concern    Note: This report was completed using computerize voice recognition software. Every effort has been made to ensure accuracy; however, inadvertent computerized transcription errors may be present.

## 2020-10-16 NOTE — TELEPHONE ENCOUNTER
Call from Narayan Skinner son stating Sarwat Encarnacion had benefits from starting Ritalin but it wears off 4 hrs later. Discussed with Denia Haider CNP and new orders received. Called back to Lucie wilkinson , instructed to give Ritalin 5 mg BID, at breakfast and lunch. Not to give later than 2 pm. Osiel Sims understanding.

## 2020-10-27 NOTE — PROGRESS NOTES
Palliative Medicine  Progress Note    Methylphenidate increased to 5 mg BID. Kaila Prescott APRN-CNP  Palliative Medicine    Note: This report was completed using computerize voiced recognition software. Every effort has been made to ensure accuracy; however, inadvertent computerized transcription errors may be present.

## (undated) DEVICE — SOLUTION IV IRRIG WATER 1000ML POUR BRL 2F7114

## (undated) DEVICE — .054" X 6" GUIDE WIRE: Brand: ACUMED

## (undated) DEVICE — Device

## (undated) DEVICE — SOLUTION IV IRRIG POUR BRL 0.9% SODIUM CHL 2F7124

## (undated) DEVICE — SPLINT ORTH W4XL15IN PLSTR OF PARIS LO EXOTHERM SMOOTH

## (undated) DEVICE — PADDING CAST W4INXL4YD SPUN DACRON POLY POR SYN VERSATILE

## (undated) DEVICE — GOWN,SIRUS,FABRNF,XL,20/CS: Brand: MEDLINE